# Patient Record
Sex: FEMALE | Race: WHITE | Employment: FULL TIME | URBAN - METROPOLITAN AREA
[De-identification: names, ages, dates, MRNs, and addresses within clinical notes are randomized per-mention and may not be internally consistent; named-entity substitution may affect disease eponyms.]

---

## 2019-09-06 PROBLEM — Z82.79 FAMILY HISTORY OF SPINA BIFIDA: Status: ACTIVE | Noted: 2019-09-06

## 2019-09-18 PROBLEM — O20.9 VAGINAL BLEEDING AFFECTING EARLY PREGNANCY: Status: ACTIVE | Noted: 2019-09-18

## 2019-09-28 LAB
ANTIBODY SCREEN, EXTERNAL: NEGATIVE
HBSAG, EXTERNAL: NEGATIVE
RUBELLA, EXTERNAL: NORMAL
TYPE, ABO & RH, EXTERNAL: NORMAL

## 2019-12-17 PROBLEM — O36.62X0 EXCESSIVE FETAL GROWTH AFFECTING MANAGEMENT OF PREGNANCY IN SECOND TRIMESTER: Status: ACTIVE | Noted: 2019-12-17

## 2020-02-23 ENCOUNTER — HOSPITAL ENCOUNTER (EMERGENCY)
Age: 35
Discharge: HOME | End: 2020-02-23
Attending: EMERGENCY MEDICINE
Payer: COMMERCIAL

## 2020-02-23 ENCOUNTER — APPOINTMENT (OUTPATIENT)
Dept: VASCULAR SURGERY | Age: 35
End: 2020-02-23
Attending: EMERGENCY MEDICINE
Payer: COMMERCIAL

## 2020-02-23 VITALS
WEIGHT: 182 LBS | DIASTOLIC BLOOD PRESSURE: 70 MMHG | OXYGEN SATURATION: 100 % | HEART RATE: 89 BPM | RESPIRATION RATE: 16 BRPM | BODY MASS INDEX: 30.32 KG/M2 | TEMPERATURE: 98.6 F | HEIGHT: 65 IN | SYSTOLIC BLOOD PRESSURE: 118 MMHG

## 2020-02-23 DIAGNOSIS — M79.89 LEG SWELLING: Primary | ICD-10-CM

## 2020-02-23 DIAGNOSIS — E87.6 HYPOKALEMIA: ICD-10-CM

## 2020-02-23 DIAGNOSIS — Z3A.30 30 WEEKS GESTATION OF PREGNANCY: ICD-10-CM

## 2020-02-23 LAB
ALBUMIN SERPL-MCNC: 2.5 G/DL (ref 3.4–5)
ALBUMIN/GLOB SERPL: 0.8 {RATIO} (ref 0.8–1.7)
ALP SERPL-CCNC: 81 U/L (ref 45–117)
ALT SERPL-CCNC: 18 U/L (ref 13–56)
ANION GAP SERPL CALC-SCNC: 8 MMOL/L (ref 3–18)
APTT PPP: 27.1 SEC (ref 23–36.4)
AST SERPL-CCNC: 19 U/L (ref 10–38)
BASOPHILS # BLD: 0 K/UL (ref 0–0.1)
BASOPHILS NFR BLD: 0 % (ref 0–2)
BILIRUB SERPL-MCNC: 0.2 MG/DL (ref 0.2–1)
BUN SERPL-MCNC: 4 MG/DL (ref 7–18)
BUN/CREAT SERPL: 9 (ref 12–20)
CALCIUM SERPL-MCNC: 8.1 MG/DL (ref 8.5–10.1)
CHLORIDE SERPL-SCNC: 111 MMOL/L (ref 100–111)
CO2 SERPL-SCNC: 24 MMOL/L (ref 21–32)
CREAT SERPL-MCNC: 0.47 MG/DL (ref 0.6–1.3)
DIFFERENTIAL METHOD BLD: ABNORMAL
EOSINOPHIL # BLD: 0.1 K/UL (ref 0–0.4)
EOSINOPHIL NFR BLD: 1 % (ref 0–5)
ERYTHROCYTE [DISTWIDTH] IN BLOOD BY AUTOMATED COUNT: 13.7 % (ref 11.6–14.5)
GLOBULIN SER CALC-MCNC: 3.2 G/DL (ref 2–4)
GLUCOSE SERPL-MCNC: 94 MG/DL (ref 74–99)
HCT VFR BLD AUTO: 34.7 % (ref 35–45)
HGB BLD-MCNC: 11.7 G/DL (ref 12–16)
INR PPP: 0.9 (ref 0.8–1.2)
LYMPHOCYTES # BLD: 1.4 K/UL (ref 0.9–3.6)
LYMPHOCYTES NFR BLD: 18 % (ref 21–52)
MCH RBC QN AUTO: 30.7 PG (ref 24–34)
MCHC RBC AUTO-ENTMCNC: 33.7 G/DL (ref 31–37)
MCV RBC AUTO: 91.1 FL (ref 74–97)
MONOCYTES # BLD: 0.6 K/UL (ref 0.05–1.2)
MONOCYTES NFR BLD: 7 % (ref 3–10)
NEUTS SEG # BLD: 5.9 K/UL (ref 1.8–8)
NEUTS SEG NFR BLD: 74 % (ref 40–73)
PLATELET # BLD AUTO: 128 K/UL (ref 135–420)
PMV BLD AUTO: 11.2 FL (ref 9.2–11.8)
POTASSIUM SERPL-SCNC: 3.2 MMOL/L (ref 3.5–5.5)
PROT SERPL-MCNC: 5.7 G/DL (ref 6.4–8.2)
PROTHROMBIN TIME: 12.3 SEC (ref 11.5–15.2)
RBC # BLD AUTO: 3.81 M/UL (ref 4.2–5.3)
SODIUM SERPL-SCNC: 143 MMOL/L (ref 136–145)
WBC # BLD AUTO: 7.9 K/UL (ref 4.6–13.2)

## 2020-02-23 PROCEDURE — 74011250637 HC RX REV CODE- 250/637: Performed by: EMERGENCY MEDICINE

## 2020-02-23 PROCEDURE — 85610 PROTHROMBIN TIME: CPT

## 2020-02-23 PROCEDURE — 85025 COMPLETE CBC W/AUTO DIFF WBC: CPT

## 2020-02-23 PROCEDURE — 85730 THROMBOPLASTIN TIME PARTIAL: CPT

## 2020-02-23 PROCEDURE — 80053 COMPREHEN METABOLIC PANEL: CPT

## 2020-02-23 PROCEDURE — 99283 EMERGENCY DEPT VISIT LOW MDM: CPT

## 2020-02-23 PROCEDURE — 93971 EXTREMITY STUDY: CPT

## 2020-02-23 RX ORDER — POTASSIUM CHLORIDE 20 MEQ/1
40 TABLET, EXTENDED RELEASE ORAL
Status: COMPLETED | OUTPATIENT
Start: 2020-02-23 | End: 2020-02-23

## 2020-02-23 RX ADMIN — POTASSIUM CHLORIDE 40 MEQ: 20 TABLET, EXTENDED RELEASE ORAL at 10:38

## 2020-02-23 NOTE — ED TRIAGE NOTES
Pt is 30 weeks pregnant, reports rt leg swelling onset Thursday, pt states she is moving and has been up on feet more often. Swelling is intermittent, denies pain.

## 2020-02-23 NOTE — ED PROVIDER NOTES
EMERGENCY DEPARTMENT HISTORY AND PHYSICAL EXAM    Date: 2020  Patient Name: Skyler Kline    History of Presenting Illness     Chief Complaint   Patient presents with    Leg Swelling         History Provided By: Patient and Patient's      History Alan Robert):   8:31 AM  Skyler Kline is a 30 y.o. female  at 30-0/7 with no significant PMHX who presents to the emergency department C/O leg swelling onset Thursday (3 days ago). Patient complains of increased swelling to the bilateral legs since Thursday. She states that she has been up and helping move them out of their house and thinks that the increased activity may be having an effect on her leg swelling. She also notes this morning that the right leg was swollen worse than the left which is unusual for her. She does admit to sleeping on her right side last night. Associated sxs include right leg swelling. Pt denies chest pain, shortness of breath, vaginal bleeding, loss of amniotic fluid, dysuria or any other sxs or complaints. Patient states she feels normal fetal movement. Chief Complaint: RLE swelling  Duration: 3 days  Timing:  acute  Location: RLE  Quality: Pressure  Severity: Mild  Modifying Factors: Nothing makes it better or worse. Associated Symptoms: denies any other associated signs or symptoms    PCP: None  OB/GYN: [facility]     Current Outpatient Medications   Medication Sig Dispense Refill    PNV66-Iron Fumarate-FA-DSS-DHA 26-1.2- mg cap Take  by mouth. Past History     Past Medical History:  History reviewed. No pertinent past medical history. Past Surgical History:  History reviewed. No pertinent surgical history. Family History:  History reviewed. No pertinent family history.     Social History:  Social History     Tobacco Use    Smoking status: Never Smoker    Smokeless tobacco: Never Used   Substance Use Topics    Alcohol use: Never     Frequency: Never    Drug use: Never       Allergies:  No Known Allergies      Review of Systems   Review of Systems   Constitutional: Negative for chills and fever. Respiratory: Negative for shortness of breath. Cardiovascular: Positive for leg swelling. Negative for chest pain. Gastrointestinal: Negative for abdominal pain, diarrhea and vomiting. Genitourinary: Negative for dysuria, pelvic pain, vaginal bleeding and vaginal discharge. All other systems reviewed and are negative. Physical Exam     Vitals:    02/23/20 0817   BP: 120/73   Pulse: 94   Resp: 16   Temp: 98.6 °F (37 °C)   SpO2: 100%   Weight: 82.6 kg (182 lb)   Height: 5' 5\" (1.651 m)     Physical Exam  Vitals signs and nursing note reviewed. Vital signs and nursing notes reviewed  CONSTITUTIONAL: Alert, in no apparent distress; well-developed; well-nourished. HEAD:  Normocephalic, atraumatic  EYES: PERRL; EOM's intact. ENTM: Nose: no rhinorrhea; Throat: no erythema or exudate, mucous membranes moist  Neck:  No JVD, supple without lymphadenopathy  RESP: Chest clear, equal breath sounds. CV: S1 and S2 WNL; No murmurs, gallops or rubs. GI: Normal bowel sounds, abdomen soft and non-tender. No masses or organomegaly. Gravid uterus, 10 cm above the umbilicus. : No costo-vertebral angle tenderness. BACK:  Non-tender  UPPER EXT:  Normal inspection  LOWER EXT: No edema, no calf tenderness. Distal pulses intact. NEURO: CN intact, reflexes 2/4 and sym, strength 5/5 and sym, sensation intact. SKIN: No rashes; Normal for age and stage. PSYCH:  Alert and oriented, normal affect.      Diagnostic Study Results     Labs -     Recent Results (from the past 12 hour(s))   CBC WITH AUTOMATED DIFF    Collection Time: 02/23/20  8:40 AM   Result Value Ref Range    WBC 7.9 4.6 - 13.2 K/uL    RBC 3.81 (L) 4.20 - 5.30 M/uL    HGB 11.7 (L) 12.0 - 16.0 g/dL    HCT 34.7 (L) 35.0 - 45.0 %    MCV 91.1 74.0 - 97.0 FL    MCH 30.7 24.0 - 34.0 PG    MCHC 33.7 31.0 - 37.0 g/dL    RDW 13.7 11.6 - 14.5 % PLATELET 767 (L) 742 - 420 K/uL    MPV 11.2 9.2 - 11.8 FL    NEUTROPHILS 74 (H) 40 - 73 %    LYMPHOCYTES 18 (L) 21 - 52 %    MONOCYTES 7 3 - 10 %    EOSINOPHILS 1 0 - 5 %    BASOPHILS 0 0 - 2 %    ABS. NEUTROPHILS 5.9 1.8 - 8.0 K/UL    ABS. LYMPHOCYTES 1.4 0.9 - 3.6 K/UL    ABS. MONOCYTES 0.6 0.05 - 1.2 K/UL    ABS. EOSINOPHILS 0.1 0.0 - 0.4 K/UL    ABS. BASOPHILS 0.0 0.0 - 0.1 K/UL    DF AUTOMATED     PROTHROMBIN TIME + INR    Collection Time: 02/23/20  8:40 AM   Result Value Ref Range    Prothrombin time 12.3 11.5 - 15.2 sec    INR 0.9 0.8 - 1.2     METABOLIC PANEL, COMPREHENSIVE    Collection Time: 02/23/20  8:40 AM   Result Value Ref Range    Sodium 143 136 - 145 mmol/L    Potassium 3.2 (L) 3.5 - 5.5 mmol/L    Chloride 111 100 - 111 mmol/L    CO2 24 21 - 32 mmol/L    Anion gap 8 3.0 - 18 mmol/L    Glucose 94 74 - 99 mg/dL    BUN 4 (L) 7.0 - 18 MG/DL    Creatinine 0.47 (L) 0.6 - 1.3 MG/DL    BUN/Creatinine ratio 9 (L) 12 - 20      GFR est AA >60 >60 ml/min/1.73m2    GFR est non-AA >60 >60 ml/min/1.73m2    Calcium 8.1 (L) 8.5 - 10.1 MG/DL    Bilirubin, total 0.2 0.2 - 1.0 MG/DL    ALT (SGPT) 18 13 - 56 U/L    AST (SGOT) 19 10 - 38 U/L    Alk. phosphatase 81 45 - 117 U/L    Protein, total 5.7 (L) 6.4 - 8.2 g/dL    Albumin 2.5 (L) 3.4 - 5.0 g/dL    Globulin 3.2 2.0 - 4.0 g/dL    A-G Ratio 0.8 0.8 - 1.7     PTT    Collection Time: 02/23/20  8:40 AM   Result Value Ref Range    aPTT 27.1 23.0 - 36.4 SEC       Radiologic Studies -   Prelim on duplex RLE demonstrates no DVT    No orders to display     CT Results  (Last 48 hours)    None        CXR Results  (Last 48 hours)    None          Medications given in the ED-  Medications   potassium chloride (K-DUR, KLOR-CON) SR tablet 40 mEq (40 mEq Oral Given 2/23/20 1038)         Medical Decision Making   I am the first provider for this patient.     I reviewed the vital signs, available nursing notes, past medical history, past surgical history, family history and social history. Vital Signs-Reviewed the patient's vital signs. Pulse Oximetry Analysis - 100% on RA     Cardiac Monitor:  Rate: 94 bpm  Rhythm: NSR    Records Reviewed: Nursing Notes    Provider Notes (Medical Decision Making):   DDX: IVC compression during sleep, pregnancy, dependent edema, very unlikely DVT    Procedures:  Procedures    ED Course:   8:31 AM Initial assessment performed. The patients presenting problems have been discussed, and they are in agreement with the care plan formulated and outlined with them. I have encouraged them to ask questions as they arise throughout their visit. 8:38 AM FHT: 146-150 by Eulogio Tovar RN      Diagnosis and Disposition     Discussion:  30 y.o. female with RLE swelling since last night bilateral swelling since Thursday. Patient most likely has some IVC compression due to sleeping on her right side. Patient is otherwise stable has no DVTs and is stable for discharge home. Patient has follow-up with her OB/GYN at [facility] tomorrow. Patient understands and agrees with this plan. DISCHARGE NOTE:  11:03 AM   Adincarvind Harris Linsey's  results have been reviewed with her. She has been counseled regarding her diagnosis, treatment, and plan. She verbally conveys understanding and agreement of the signs, symptoms, diagnosis, treatment and prognosis and additionally agrees to follow up as discussed. She also agrees with the care-plan and conveys that all of her questions have been answered. I have also provided discharge instructions for her that include: educational information regarding their diagnosis and treatment, and list of reasons why they would want to return to the ED prior to their follow-up appointment, should her condition change. She has been provided with education for proper emergency department utilization. CLINICAL IMPRESSION:    1. Leg swelling    2. 30 weeks gestation of pregnancy    3. Hypokalemia        PLAN:  1. D/C Home  2.    Current Discharge Medication List        3. Follow-up Information     Follow up With Specialties Details Why [address]  Internal Medicine  In 1 day For follow up from Emergency Department visit. [address]    [facility] [facility] EMERGENCY DEPT Emergency Medicine  As needed; If symptoms worsen [address] [id number] [address]     Please note that this dictation was completed with [email or web address], the computer voice recognition software. Quite often unanticipated grammatical, syntax, homophones, and other interpretive errors are inadvertently transcribed by the computer software. Please disregard these errors. Please excuse any errors that have escaped final proofreading.     Sergio Cortez MD

## 2020-02-23 NOTE — DISCHARGE INSTRUCTIONS
Patient Education        Weeks 26 to 30 of Your Pregnancy: Care Instructions  Your Care Instructions    You are now in your last trimester of pregnancy. Your baby is growing rapidly. And you'll probably feel your baby moving around more often. Your doctor may ask you to count your baby's kicks. Your back may ache as your body gets used to your baby's size and length. If you haven't already had the Tdap shot during this pregnancy, talk to your doctor about getting it. It will help protect your  against pertussis infection. During this time, it's important to take care of yourself and pay attention to what your body needs. If you feel sexual, explore ways to be close with your partner that match your comfort and desire. Use the tips provided in this care sheet to find ways to be sexual in your own way. Follow-up care is a key part of your treatment and safety. Be sure to make and go to all appointments, and call your doctor if you are having problems. It's also a good idea to know your test results and keep a list of the medicines you take. How can you care for yourself at home? Take it easy at work  · Take frequent breaks. If possible, stop working when you are tired, and rest during your lunch hour. · Take bathroom breaks every 2 hours. · Change positions often. If you sit for long periods, stand up and walk around. · When you stand for a long time, keep one foot on a low stool with your knee bent. After standing a lot, sit with your feet up. · Avoid fumes, chemicals, and tobacco smoke. Be sexual in your own way  · Having sex during pregnancy is okay, unless your doctor tells you not to. · You may be very interested in sex, or you may have no interest at all. · Your growing belly can make it hard to find a good position during intercourse. [place] and explore. · You may get cramps in your uterus when your partner touches your breasts.   · A back rub may relieve the backache or cramps that sometimes follow orgasm. Learn about  labor  · Watch for signs of  labor. You may be going into labor if:  ? You have menstrual-like cramps, with or without nausea. ? You have about 6 or more contractions in 1 hour, even after you have had a glass of water and are resting. ? You have a low, dull backache that does not go away when you change your position. ? You have pain or pressure in your pelvis that comes and goes in a pattern. ? You have intestinal cramping or flu-like symptoms, with or without diarrhea.  ? You notice an increase or change in your vaginal discharge. Discharge may be heavy, mucus-like, watery, or streaked with blood. ? Your water breaks. · If you think you have  labor:  ? Drink 2 or 3 glasses of water or juice. Not drinking enough fluids can cause contractions. ? Stop what you are doing, and empty your bladder. Then lie down on your left side for at least 1 hour. ? While lying on your side, find your breast bone. Put your fingers in the soft spot just below it. Move your fingers down toward your belly button to find the top of your uterus. Check to see if it is tight. ? Contractions can be weak or strong. Record your contractions for an hour. Time a contraction from the start of one contraction to the start of the next one.  ? Single or several strong contractions without a pattern are called Corbin-Amezcua contractions. They are practice contractions but not the start of labor. They often stop if you change what you are doing. ? Call your doctor if you have regular contractions. Where can you learn more? Go to http://alex-lili.info/. Enter K644 in the search box to learn more about \"Weeks 26 to 30 of Your Pregnancy: Care Instructions. \"  Current as of: May 29, 2019  Content Version: 12.2  © 6479-5949 [employer].  Care instructions adapted under license by [employer] (which disclaims liability or warranty for this information). If you have questions about a medical condition or this instruction, always ask your healthcare professional. [address] any warranty or liability for your use of this information. Patient Education        Hypokalemia: Care Instructions  Your Care Instructions    Hypokalemia (say \"wd-gs-[id number]-ASHER-loreto-uh\") is a low level of potassium. The heart, muscles, kidneys, and nervous system all need potassium to work well. This problem has many different causes. Kidney problems, diet, and medicines like diuretics and laxatives can cause it. So can vomiting or diarrhea. In some cases, cancer is the cause. Your doctor may do tests to find the cause of your low potassium levels. You may need medicines to bring your potassium levels back to normal. You may also need regular blood tests to check your potassium. If you have very low potassium, you may need intravenous (IV) medicines. You also may need tests to check the electrical activity of your heart. Heart problems caused by low potassium levels can be very serious. Follow-up care is a key part of your treatment and safety. Be sure to make and go to all appointments, and call your doctor if you are having problems. It's also a good idea to know your test results and keep a list of the medicines you take. How can you care for yourself at home? · If your doctor recommends it, eat foods that have a lot of potassium. These include fresh fruits, juices, and vegetables. They also include nuts, beans, and milk. · Be safe with medicines. If your doctor prescribes medicines or potassium supplements, take them exactly as directed. Call your doctor if you have any problems with your medicines. · Get your potassium levels tested as often as your doctor tells you. When should you call for help? Call 911 anytime you think you may need emergency care. For example, call if:    · You feel like your heart is missing beats.  Heart problems caused by low potassium can cause death.     · You passed out (lost consciousness).     · You have a seizure.    Call your doctor now or seek immediate medical care if:    · You feel weak or unusually tired.     · You have severe arm or leg cramps.     · You have tingling or numbness.     · You feel sick to your stomach, or you vomit.     · You have belly cramps.     · You feel bloated or constipated.     · You have to urinate a lot.     · You feel very thirsty most of the time.     · You are dizzy or lightheaded, or you feel like you may faint.     · You feel depressed, or you lose touch with reality.    Watch closely for changes in your health, and be sure to contact your doctor if:    · You do not get better as expected. Where can you learn more? Go to http://alex-lili.info/. Enter G358 in the search box to learn more about \"Hypokalemia: Care Instructions. \"  Current as of: November 6, 2018  Content Version: 12.2  © 4575-0511 [employer]. Care instructions adapted under license by [employer] (which disclaims liability or warranty for this information). If you have questions about a medical condition or this instruction, always ask your healthcare professional. [address] any warranty or liability for your use of this information.

## 2020-04-06 LAB — GRBS, EXTERNAL: NEGATIVE

## 2020-05-04 ENCOUNTER — HOSPITAL ENCOUNTER (INPATIENT)
Age: 35
LOS: 2 days | Discharge: HOME | End: 2020-05-06
Attending: OBSTETRICS & GYNECOLOGY | Admitting: OBSTETRICS & GYNECOLOGY
Payer: COMMERCIAL

## 2020-05-04 ENCOUNTER — ANESTHESIA (OUTPATIENT)
Dept: LABOR AND DELIVERY | Age: 35
End: 2020-05-04
Payer: COMMERCIAL

## 2020-05-04 ENCOUNTER — ANESTHESIA EVENT (OUTPATIENT)
Dept: LABOR AND DELIVERY | Age: 35
End: 2020-05-04
Payer: COMMERCIAL

## 2020-05-04 PROBLEM — Z34.83 SUPERVISION OF NORMAL IUP (INTRAUTERINE PREGNANCY) IN MULTIGRAVIDA, THIRD TRIMESTER: Status: ACTIVE | Noted: 2020-05-04

## 2020-05-04 LAB
ABO + RH BLD: NORMAL
BASOPHILS # BLD: 0 K/UL (ref 0–0.1)
BASOPHILS NFR BLD: 0 % (ref 0–2)
BLOOD GROUP ANTIBODIES SERPL: NORMAL
DIFFERENTIAL METHOD BLD: ABNORMAL
EOSINOPHIL # BLD: 0.1 K/UL (ref 0–0.4)
EOSINOPHIL NFR BLD: 0 % (ref 0–5)
ERYTHROCYTE [DISTWIDTH] IN BLOOD BY AUTOMATED COUNT: 14.1 % (ref 11.6–14.5)
HCT VFR BLD AUTO: 40.6 % (ref 35–45)
HGB BLD-MCNC: 13.8 G/DL (ref 12–16)
LYMPHOCYTES # BLD: 1.8 K/UL (ref 0.9–3.6)
LYMPHOCYTES NFR BLD: 14 % (ref 21–52)
MCH RBC QN AUTO: 30.7 PG (ref 24–34)
MCHC RBC AUTO-ENTMCNC: 34 G/DL (ref 31–37)
MCV RBC AUTO: 90.4 FL (ref 74–97)
MONOCYTES # BLD: 0.7 K/UL (ref 0.05–1.2)
MONOCYTES NFR BLD: 6 % (ref 3–10)
NEUTS SEG # BLD: 10.6 K/UL (ref 1.8–8)
NEUTS SEG NFR BLD: 80 % (ref 40–73)
PLATELET # BLD AUTO: 132 K/UL (ref 135–420)
PMV BLD AUTO: 11.6 FL (ref 9.2–11.8)
RBC # BLD AUTO: 4.49 M/UL (ref 4.2–5.3)
SPECIMEN EXP DATE BLD: NORMAL
WBC # BLD AUTO: 13.2 K/UL (ref 4.6–13.2)

## 2020-05-04 PROCEDURE — 74011000250 HC RX REV CODE- 250: Performed by: NURSE ANESTHETIST, CERTIFIED REGISTERED

## 2020-05-04 PROCEDURE — 0KQM0ZZ REPAIR PERINEUM MUSCLE, OPEN APPROACH: ICD-10-PCS | Performed by: ADVANCED PRACTICE MIDWIFE

## 2020-05-04 PROCEDURE — 74011250636 HC RX REV CODE- 250/636: Performed by: MIDWIFE

## 2020-05-04 PROCEDURE — 74011000250 HC RX REV CODE- 250

## 2020-05-04 PROCEDURE — 74011250636 HC RX REV CODE- 250/636: Performed by: ADVANCED PRACTICE MIDWIFE

## 2020-05-04 PROCEDURE — 75410000000 HC DELIVERY VAGINAL/SINGLE

## 2020-05-04 PROCEDURE — 36415 COLL VENOUS BLD VENIPUNCTURE: CPT

## 2020-05-04 PROCEDURE — 77030040830 HC CATH URETH FOL MDII -A

## 2020-05-04 PROCEDURE — 74011250636 HC RX REV CODE- 250/636

## 2020-05-04 PROCEDURE — 00HU33Z INSERTION OF INFUSION DEVICE INTO SPINAL CANAL, PERCUTANEOUS APPROACH: ICD-10-PCS | Performed by: ANESTHESIOLOGY

## 2020-05-04 PROCEDURE — 85025 COMPLETE CBC W/AUTO DIFF WBC: CPT

## 2020-05-04 PROCEDURE — 74011250636 HC RX REV CODE- 250/636: Performed by: NURSE ANESTHETIST, CERTIFIED REGISTERED

## 2020-05-04 PROCEDURE — 65270000029 HC RM PRIVATE

## 2020-05-04 PROCEDURE — 74011250637 HC RX REV CODE- 250/637: Performed by: ADVANCED PRACTICE MIDWIFE

## 2020-05-04 PROCEDURE — 77030007879 HC KT SPN EPDRL TELE -B: Performed by: ANESTHESIOLOGY

## 2020-05-04 PROCEDURE — 76060000078 HC EPIDURAL ANESTHESIA

## 2020-05-04 PROCEDURE — 75410000002 HC LABOR FEE PER 1 HR

## 2020-05-04 PROCEDURE — 86900 BLOOD TYPING SEROLOGIC ABO: CPT

## 2020-05-04 PROCEDURE — 75410000003 HC RECOV DEL/VAG/CSECN EA 0.5 HR

## 2020-05-04 RX ORDER — FENTANYL/ROPIVACAINE/NS/PF 2MCG/ML-.1
PLASTIC BAG, INJECTION (ML) EPIDURAL
Status: COMPLETED
Start: 2020-05-04 | End: 2020-05-04

## 2020-05-04 RX ORDER — FENTANYL/ROPIVACAINE/NS/PF 2MCG/ML-.1
1-15 PLASTIC BAG, INJECTION (ML) EPIDURAL
Status: DISCONTINUED | OUTPATIENT
Start: 2020-05-04 | End: 2020-05-05

## 2020-05-04 RX ORDER — SODIUM CHLORIDE 0.9 % (FLUSH) 0.9 %
5-40 SYRINGE (ML) INJECTION AS NEEDED
Status: DISCONTINUED | OUTPATIENT
Start: 2020-05-04 | End: 2020-05-05

## 2020-05-04 RX ORDER — OXYTOCIN/0.9 % SODIUM CHLORIDE 20/1000 ML
125 PLASTIC BAG, INJECTION (ML) INTRAVENOUS CONTINUOUS
Status: DISCONTINUED | OUTPATIENT
Start: 2020-05-04 | End: 2020-05-05

## 2020-05-04 RX ORDER — BUTORPHANOL TARTRATE 2 MG/ML
2 INJECTION INTRAMUSCULAR; INTRAVENOUS
Status: DISCONTINUED | OUTPATIENT
Start: 2020-05-04 | End: 2020-05-05

## 2020-05-04 RX ORDER — FENTANYL CITRATE 50 UG/ML
100 INJECTION, SOLUTION INTRAMUSCULAR; INTRAVENOUS ONCE
Status: COMPLETED | OUTPATIENT
Start: 2020-05-04 | End: 2020-05-04

## 2020-05-04 RX ORDER — ONDANSETRON 2 MG/ML
4 INJECTION INTRAMUSCULAR; INTRAVENOUS
Status: DISCONTINUED | OUTPATIENT
Start: 2020-05-04 | End: 2020-05-05

## 2020-05-04 RX ORDER — ACETAMINOPHEN 10 MG/ML
1000 INJECTION, SOLUTION INTRAVENOUS ONCE
Status: COMPLETED | OUTPATIENT
Start: 2020-05-04 | End: 2020-05-05

## 2020-05-04 RX ORDER — LIDOCAINE HYDROCHLORIDE 10 MG/ML
INJECTION INFILTRATION; PERINEURAL AS NEEDED
Status: DISCONTINUED | OUTPATIENT
Start: 2020-05-04 | End: 2020-05-04 | Stop reason: HOSPADM

## 2020-05-04 RX ORDER — LIDOCAINE HYDROCHLORIDE AND EPINEPHRINE 15; 5 MG/ML; UG/ML
INJECTION, SOLUTION EPIDURAL AS NEEDED
Status: DISCONTINUED | OUTPATIENT
Start: 2020-05-04 | End: 2020-05-04 | Stop reason: HOSPADM

## 2020-05-04 RX ORDER — MINERAL OIL
30 OIL (ML) ORAL AS NEEDED
Status: COMPLETED | OUTPATIENT
Start: 2020-05-04 | End: 2020-05-04

## 2020-05-04 RX ORDER — HYDROMORPHONE HYDROCHLORIDE 1 MG/ML
1 INJECTION, SOLUTION INTRAMUSCULAR; INTRAVENOUS; SUBCUTANEOUS
Status: DISCONTINUED | OUTPATIENT
Start: 2020-05-04 | End: 2020-05-05

## 2020-05-04 RX ORDER — SODIUM CHLORIDE 0.9 % (FLUSH) 0.9 %
5-40 SYRINGE (ML) INJECTION EVERY 8 HOURS
Status: DISCONTINUED | OUTPATIENT
Start: 2020-05-04 | End: 2020-05-05

## 2020-05-04 RX ORDER — OXYTOCIN/0.9 % SODIUM CHLORIDE 30/500 ML
0-20 PLASTIC BAG, INJECTION (ML) INTRAVENOUS
Status: DISCONTINUED | OUTPATIENT
Start: 2020-05-04 | End: 2020-05-05

## 2020-05-04 RX ORDER — OXYTOCIN/0.9 % SODIUM CHLORIDE 20/1000 ML
999 PLASTIC BAG, INJECTION (ML) INTRAVENOUS ONCE
Status: DISPENSED | OUTPATIENT
Start: 2020-05-04 | End: 2020-05-04

## 2020-05-04 RX ORDER — NALBUPHINE HYDROCHLORIDE 10 MG/ML
10 INJECTION, SOLUTION INTRAMUSCULAR; INTRAVENOUS; SUBCUTANEOUS
Status: DISCONTINUED | OUTPATIENT
Start: 2020-05-04 | End: 2020-05-05

## 2020-05-04 RX ORDER — ROPIVACAINE IN 0.9% SOD CHL/PF 0.2 %
PLASTIC BAG, INJECTION (ML) EPIDURAL AS NEEDED
Status: DISCONTINUED | OUTPATIENT
Start: 2020-05-04 | End: 2020-05-04 | Stop reason: HOSPADM

## 2020-05-04 RX ORDER — METHYLERGONOVINE MALEATE 0.2 MG/ML
0.2 INJECTION INTRAVENOUS AS NEEDED
Status: DISCONTINUED | OUTPATIENT
Start: 2020-05-04 | End: 2020-05-05 | Stop reason: HOSPADM

## 2020-05-04 RX ORDER — FENTANYL CITRATE 50 UG/ML
INJECTION, SOLUTION INTRAMUSCULAR; INTRAVENOUS AS NEEDED
Status: DISCONTINUED | OUTPATIENT
Start: 2020-05-04 | End: 2020-05-04 | Stop reason: HOSPADM

## 2020-05-04 RX ORDER — LIDOCAINE HYDROCHLORIDE 10 MG/ML
20 INJECTION, SOLUTION EPIDURAL; INFILTRATION; INTRACAUDAL; PERINEURAL AS NEEDED
Status: DISCONTINUED | OUTPATIENT
Start: 2020-05-04 | End: 2020-05-05

## 2020-05-04 RX ORDER — TERBUTALINE SULFATE 1 MG/ML
0.25 INJECTION SUBCUTANEOUS
Status: DISCONTINUED | OUTPATIENT
Start: 2020-05-04 | End: 2020-05-05

## 2020-05-04 RX ORDER — NALOXONE HYDROCHLORIDE 0.4 MG/ML
0.2 INJECTION, SOLUTION INTRAMUSCULAR; INTRAVENOUS; SUBCUTANEOUS AS NEEDED
Status: DISCONTINUED | OUTPATIENT
Start: 2020-05-04 | End: 2020-05-05

## 2020-05-04 RX ORDER — FENTANYL CITRATE 50 UG/ML
INJECTION, SOLUTION INTRAMUSCULAR; INTRAVENOUS
Status: COMPLETED
Start: 2020-05-04 | End: 2020-05-04

## 2020-05-04 RX ORDER — SODIUM CHLORIDE, SODIUM LACTATE, POTASSIUM CHLORIDE, CALCIUM CHLORIDE 600; 310; 30; 20 MG/100ML; MG/100ML; MG/100ML; MG/100ML
125 INJECTION, SOLUTION INTRAVENOUS CONTINUOUS
Status: DISCONTINUED | OUTPATIENT
Start: 2020-05-04 | End: 2020-05-05

## 2020-05-04 RX ADMIN — Medication 2 MILLI-UNITS/MIN: at 12:15

## 2020-05-04 RX ADMIN — SODIUM CHLORIDE, SODIUM LACTATE, POTASSIUM CHLORIDE, AND CALCIUM CHLORIDE 125 ML/HR: 600; 310; 30; 20 INJECTION, SOLUTION INTRAVENOUS at 14:40

## 2020-05-04 RX ADMIN — FENTANYL CITRATE 50 MCG: 50 INJECTION, SOLUTION INTRAMUSCULAR; INTRAVENOUS at 14:30

## 2020-05-04 RX ADMIN — FENTANYL CITRATE 100 MCG: 50 INJECTION INTRAMUSCULAR; INTRAVENOUS at 14:19

## 2020-05-04 RX ADMIN — FENTANYL CITRATE 100 MCG: 50 INJECTION, SOLUTION INTRAMUSCULAR; INTRAVENOUS at 14:19

## 2020-05-04 RX ADMIN — ACETAMINOPHEN 1000 MG: 10 INJECTION, SOLUTION INTRAVENOUS at 22:49

## 2020-05-04 RX ADMIN — SODIUM CHLORIDE, SODIUM LACTATE, POTASSIUM CHLORIDE, AND CALCIUM CHLORIDE 125 ML/HR: 600; 310; 30; 20 INJECTION, SOLUTION INTRAVENOUS at 09:00

## 2020-05-04 RX ADMIN — SODIUM CHLORIDE, SODIUM LACTATE, POTASSIUM CHLORIDE, AND CALCIUM CHLORIDE 125 ML/HR: 600; 310; 30; 20 INJECTION, SOLUTION INTRAVENOUS at 18:46

## 2020-05-04 RX ADMIN — MINERAL 30 ML: 999 OIL ORAL at 22:50

## 2020-05-04 RX ADMIN — Medication 999 ML/HR: at 23:21

## 2020-05-04 RX ADMIN — SODIUM CHLORIDE, SODIUM LACTATE, POTASSIUM CHLORIDE, AND CALCIUM CHLORIDE 500 ML: 600; 310; 30; 20 INJECTION, SOLUTION INTRAVENOUS at 06:57

## 2020-05-04 RX ADMIN — Medication 12 ML/HR: at 14:45

## 2020-05-04 RX ADMIN — FENTANYL CITRATE 50 MCG: 50 INJECTION, SOLUTION INTRAMUSCULAR; INTRAVENOUS at 14:34

## 2020-05-04 RX ADMIN — LIDOCAINE HYDROCHLORIDE,EPINEPHRINE BITARTRATE 5 ML: 15; .005 INJECTION, SOLUTION EPIDURAL; INFILTRATION; INTRACAUDAL; PERINEURAL at 14:29

## 2020-05-04 RX ADMIN — Medication 4 ML: at 14:30

## 2020-05-04 RX ADMIN — Medication 4 ML: at 14:34

## 2020-05-04 RX ADMIN — LIDOCAINE HYDROCHLORIDE 3 ML: 10 INJECTION, SOLUTION INFILTRATION; PERINEURAL at 14:23

## 2020-05-04 NOTE — PROGRESS NOTES
Labor Progress Note  Patient seen, fetal heart rate and contraction pattern evaluated, patient examined. Reports decreased pain from contractions. Resting comfortably with epidural in place  Patient Vitals for the past 1 hrs:   BP Pulse   05/04/20 1400 118/68 80       Physical Exam:  Cervical Exam:  5 cm dilated    80% effaced    0 station    Presenting Part: cephalic  Cervical Position: mid position  Membranes:  Artificial Rupture of Membranes;  Amniotic Fluid: small amount of thick meconium fluid  Uterine Activity: Frequency: Every 1-4 minutes, Duration: 60 seconds and Intensity: moderate  Fetal Heart Rate: Baseline: 140 per minute  Variability: moderate  Accelerations: no  Decelerations: variable  Uterine contractions: irregular, every 1-3 minutes    Assessment/Plan:  Reassuring fetal status, Labor  Progressing normally, Continue plan for vaginal delivery

## 2020-05-04 NOTE — PROGRESS NOTES
06 Pt is a 30 y.o.  at 40w3d, arrived to L&D triage with c/o of contractions. EFM and TOCO applied, call bell within reach. SVE /2. REINA Gutierrez CNM aware. Orders received for recheck in two hours, IV with fluids. 715 Bedside shift change report given to MARLI Rey (oncoming nurse) by Su Hart RN (offgoing nurse). Report included the following information SBAR, Kardex, Intake/Output, MAR and Recent Results.

## 2020-05-04 NOTE — ANESTHESIA PREPROCEDURE EVALUATION
Relevant Problems   No relevant active problems       Anesthetic History   No history of anesthetic complications            Review of Systems / Medical History  Patient summary reviewed, nursing notes reviewed and pertinent labs reviewed    Pulmonary  Within defined limits                 Neuro/Psych   Within defined limits           Cardiovascular                  Exercise tolerance: >4 METS     GI/Hepatic/Renal  Within defined limits              Endo/Other        Obesity     Other Findings   Comments: A person in her fathers family had spina bifida. Not herself and everything normal.           Physical Exam    Airway  Mallampati: III  TM Distance: < 4 cm  Neck ROM: normal range of motion   Mouth opening: Normal     Cardiovascular               Dental  No notable dental hx       Pulmonary                 Abdominal  GI exam deferred       Other Findings            Anesthetic Plan    ASA: 2  Anesthesia type: epidural            Anesthetic plan and risks discussed with: Patient      Risks and benefits of epidural include but not limited to a headache (with risk of repair requiring blood patch), backache, bleeding, infection, nerve injury, paralysis, failure with need to replace, aand hemodynamic changes.

## 2020-05-04 NOTE — H&P
History & Physical    Name: Maida Hardy MRN: [id number]  SSN: [id number]    YOB: 1985  Age: 30 y.o. Sex: female        Subjective:     Estimated Date of Delivery: 20  OB History    Para Term  AB Living   1             SAB TAB Ectopic Molar Multiple Live Births                    # Outcome Date GA Lbr Jose/2nd Weight Sex Delivery Anes PTL Lv   1 Current                Ms. Menezes Sow is admitted with pregnancy at 40w3d for early/active labor. Prenatal course was normal. Please see prenatal records for details. No past medical history on file. No past surgical history on file. Social History     Occupational History    Not on file   Tobacco Use    Smoking status: Never Smoker    Smokeless tobacco: Never Used   Substance and Sexual Activity    Alcohol use: Never     Frequency: Never    Drug use: Never    Sexual activity: Yes     Partners: Male     Birth control/protection: None     No family history on file. No Known Allergies  Prior to Admission medications    Medication Sig Start Date End Date Taking? Authorizing Provider   PNV66-Iron Fumarate-FA-DSS-DHA 26-1.2- mg cap Take  by mouth. Provider, Historical        Review of Systems: A comprehensive review of systems was negative except for that written in the HPI.     Objective:     Vitals:  Vitals:    20 0647 20 0700 20 0730 20 0743   BP: 114/72 123/68 116/74 119/68   Pulse: 97 82 85 78   Resp:   20    Temp:   98.4 °F (36.9 °C)         Physical Exam:  Cervical Exam: 4 cm dilated    60% effaced    -2 station    Presenting Part: cephalic  Cervical Position: mid position  Consistency: Soft  Membranes:  Intact  Fetal Heart Rate: Baseline: 140 per minute  Variability: moderate  Accelerations: no  Decelerations: none  Uterine contractions: regular, every 3-4 minutes    Prenatal Labs:   Lab Results   Component Value Date/Time    ABO/Rh(D) B POSITIVE 2020 06:40 AM         Assessment/Plan: Active Problems:    * No active hospital problems. *       Plan: Admit for Reassuring fetal status, Labor Progressing normally, Regular contractions, reassuring, however non-reactive tracing at 40 weeks gestation with cervical change. Will admit for early labor and augment as needed. Continue plan for vaginal delivery. Group B Strep was negative.

## 2020-05-04 NOTE — ANESTHESIA PROCEDURE NOTES
Epidural Block    Start time: 5/4/2020 2:22 PM  End time: 5/4/2020 2:30 PM  Performed by: Ethel Mandujano, CRNA  Authorized by: Keisha Conway MD     Pre-Procedure  Indication: labor epidural    Preanesthetic Checklist: patient identified, risks and benefits discussed, anesthesia consent, site marked, patient being monitored, timeout performed and anesthesia consent    Timeout Time: 14:22        Epidural:   Patient position:  Seated  Prep region:  Lumbar  Prep: Chlorhexidine    Location:  L3-4    Needle and Epidural Catheter:   Needle Type:  Tuohy  Needle Gauge:  17 G  Injection Technique:  Loss of resistance using saline  Attempts:  1  Catheter Size:  20 G  Catheter at Skin Depth (cm):  11  Depth in Epidural Space (cm):  5  Events: no blood with aspiration, no cerebrospinal fluid with aspiration, no paresthesia and negative aspiration test    Test Dose:  Negative    Assessment:   Catheter Secured:  Tegaderm and tape  Insertion:  Uncomplicated  Patient tolerance:  Patient tolerated the procedure well with no immediate complications

## 2020-05-04 NOTE — ROUTINE PROCESS
0715 Bedside and Verbal shift change report given to R. Romaine Schlatter (oncoming nurse) by Han Lawrence RN (offgoing nurse). Report included the following information SBAR, Kardex, Intake/Output, MAR, Accordion and Recent Results. 0756 Pt sitting on birthing ball. US and TOCO adjusted. Call bell within reach. 0820 Pt ambulating to the bathroom. Voiding. 3825 Pt in high fowlers. US nad TOCO adjusted. Call bell within reach. 7625 Pt ambulating to Room 1. 
 
0843 Pt oriented to the room and call bell. Pt in high fowlers. US and TOCO adjusted. Pt's family member at bedside. Call bell within reach. 0900 Pt on birthing ball. US and TOCO adjusted. Call bell within reach. 0940 Pt eating pop-sickle. Call bell within reach. 1132 Pt ambulating to the bathroom. Voiding. 1136 Pt in low fowlers. US and TOCO adjusted. Call bell within reach. 1356 Paged anesthesia. 200 Dr. Colonel Boone at bedside explaining epidural procedure, side effects, risks, benefits, and positioning. Patient verbalizes understanding. 1415 Pt ambulating to the bathroom. Voiding. 1421 Pt sitting up in high fowlers. US and TOCO adjusted. RN and CRNA at bedside. Time-out: 1422 Procedure JZBTU:5991 Catheter in, needle out: 1428 Test dose: 7610 Fentanyl vial handed to MD at bedside. Loading RFOV:0722 Patient connected to pump:1435 1437 Pt in low fowlers. US and TOCO adjusted. Call bell within reach. 3100 Perimeter Fitzhugh at bedside. SVE: 5/80/0. AROM, small amount of thick meconium fluids. 1450 Tom placed without difficulty. Pt tolerated procedure well. 1455 Pt in low fowlers. US and TOCO adjusted. Call bell within reach. 1540 Pt on left lateral with peanut ball placed in. US and TOCO adjusted. Call bell within reach. Pt denies needs at this time. 1705 Pt in throne's position. US and TOCO adjusted. Call bell within reach. 1812 Pt turned on right side with peanut ball within.  US and TOCO adjusted. Call bell within reach. Pt denies needs at this time. [place] 6199 at bedside. SVE: 8/80/0. 
 
1845 Pt turned on right side with left leg on stirrup. US and TOCO adjusted. Call bell within reach. 1910 Bedside and Verbal shift change report given to REINA Rivera RN (oncoming nurse) by MARLI Wood (offgoing nurse). Report included the following information SBAR, Kardex, Procedure Summary, Intake/Output, MAR, Accordion and Recent Results.

## 2020-05-05 PROCEDURE — 75410000003 HC RECOV DEL/VAG/CSECN EA 0.5 HR

## 2020-05-05 PROCEDURE — 88307 TISSUE EXAM BY PATHOLOGIST: CPT

## 2020-05-05 PROCEDURE — 75410000002 HC LABOR FEE PER 1 HR

## 2020-05-05 PROCEDURE — 75410000000 HC DELIVERY VAGINAL/SINGLE

## 2020-05-05 PROCEDURE — 76060000078 HC EPIDURAL ANESTHESIA

## 2020-05-05 PROCEDURE — 74011250637 HC RX REV CODE- 250/637: Performed by: ADVANCED PRACTICE MIDWIFE

## 2020-05-05 PROCEDURE — 65270000029 HC RM PRIVATE

## 2020-05-05 RX ORDER — AMOXICILLIN 250 MG
1 CAPSULE ORAL
Status: DISCONTINUED | OUTPATIENT
Start: 2020-05-05 | End: 2020-05-06 | Stop reason: HOSPADM

## 2020-05-05 RX ORDER — OXYCODONE AND ACETAMINOPHEN 5; 325 MG/1; MG/1
2 TABLET ORAL
Status: DISCONTINUED | OUTPATIENT
Start: 2020-05-05 | End: 2020-05-06 | Stop reason: HOSPADM

## 2020-05-05 RX ORDER — IBUPROFEN 400 MG/1
800 TABLET ORAL
Status: DISCONTINUED | OUTPATIENT
Start: 2020-05-05 | End: 2020-05-06 | Stop reason: HOSPADM

## 2020-05-05 RX ORDER — ZOLPIDEM TARTRATE 5 MG/1
5 TABLET ORAL
Status: DISCONTINUED | OUTPATIENT
Start: 2020-05-05 | End: 2020-05-06 | Stop reason: HOSPADM

## 2020-05-05 RX ORDER — IBUPROFEN 800 MG/1
800 TABLET ORAL
Qty: 60 TAB | Refills: 1 | Status: SHIPPED | OUTPATIENT
Start: 2020-05-05 | End: 2021-07-15

## 2020-05-05 RX ORDER — PROMETHAZINE HYDROCHLORIDE 25 MG/ML
25 INJECTION, SOLUTION INTRAMUSCULAR; INTRAVENOUS
Status: DISCONTINUED | OUTPATIENT
Start: 2020-05-05 | End: 2020-05-05

## 2020-05-05 RX ORDER — ACETAMINOPHEN 325 MG/1
650 TABLET ORAL
Status: DISCONTINUED | OUTPATIENT
Start: 2020-05-05 | End: 2020-05-06 | Stop reason: HOSPADM

## 2020-05-05 RX ADMIN — IBUPROFEN 800 MG: 400 TABLET, FILM COATED ORAL at 02:46

## 2020-05-05 RX ADMIN — IBUPROFEN 800 MG: 400 TABLET, FILM COATED ORAL at 16:12

## 2020-05-05 NOTE — PROGRESS NOTES
46- TRANSFER - IN REPORT:  Verbal report received from Kathryn Xiao on [facility]  being received from Labor and Delivery for routine progression of care  Report consisted of patients Situation, Background, Assessment and   Recommendations(SBAR). Information from the following report(s) SBAR, Procedure Summary, Intake/Output, MAR and Recent Results was reviewed with the receiving nurse. 0255- Pt joined at bedside by FOB and baby. Baby swaddled, supine in bassinet. Verbal permission obtained from pt to discuss plan of care and perform assessment with FOB present. Pt denies any drug known drug allergies. AAOx4. Vital signs stable. Pain 3/10, will continue to monitor. Educated on pain management and medications available, plan of care, signs and symptoms to report, and keeping gripper socks on while ambulating. Instructed pt to call nursing for assistance to restroom for void. Whiteboard updated. Breath sounds clear bilaterally. Fundus firm at U, midline, scant rubra lochia. No clots noted. Pt denies any headache, visual disturbances, or epigastric pain. INT present, flushed, capped, and patent. Pt educated on infection control and prevention and visitor policy. MOB and FOB instructed to call nursing to obtain baby BS before feeds per protocol. MOB and FOB educated on bulb syringe use, baby feeding frequency, recording I/O, SIDs risks and preventive measures, and safe sleep-verbalizes understanding. No further questions on concerns at this time. Assessment complete. Call bell within reach. Bed in lowest position. 0330- Pt resting comfortable supine in bed, awake and alert. No needs expressed at this time. Bed in lowest position. Call bell within reach. 0510- Pt resting comfortable supine in bed, awake and alert. Fundus firm at U, midline, scant rubra lochia. No clots noted. Pt denies any headache, visual disturbances, or epigastric pain. No needs expressed at this time. Bed in lowest position. Call bell within reach. 0577- Pt resting comfortable supine in bed, awake and alert. No needs expressed at this time. Bed in lowest position. Call bell within reach. 0715- Bedside and Verbal shift change report given to LYUBOV Medina RN (oncoming nurse) by Sri Scott RN (offgoing nurse). Report included the following information SBAR, Kardex, Intake/Output, MAR and Recent Results. Opportunities for questions was provided.      Patient Vitals for the past 4 hrs:   Temp Pulse Resp BP SpO2   05/05/20 0258 98.7 °F (37.1 °C) 81 20 109/64 99 %

## 2020-05-05 NOTE — LACTATION NOTE
This note was copied from a baby's chart. [id number] per mom,  has been falling asleep after a few sucks at the breast. Discussed with parents normal DOL expectations/behaviors. Both verbalized understanding.  did latch successfully and deep while LC was in the room, but even with stimulation quickly fell asleep. Will page for assistance as needed. 2200 mom asleep at this time. Per FOB,  has latched and fed well several times this evening. Will page for assistance if needed.

## 2020-05-05 NOTE — LACTATION NOTE
Per mom, infant latching and nursing well. Mom educated on breastfeeding basics--hunger cues, feeding on demand, waking baby if baby sleeps too long between feeds, importance of skin to skin, positioning and latching, risk of pacifier use and supplemental feedings, and importance of rooming in--and use of log sheet. Mom also educated on benefits of breastfeeding for herself and baby. Mom verbalized understanding. No questions at this time. 1240 Infant latched and nursing well for 20 minutes.

## 2020-05-05 NOTE — PROGRESS NOTES
Problem: Patient Education: Go to Patient Education Activity  Goal: Patient/Family Education  Outcome: Progressing Towards Goal     Problem: Pain  Goal: *Control of Pain  Outcome: Progressing Towards Goal     Problem: Patient Education: Go to Patient Education Activity  Goal: Patient/Family Education  Outcome: Progressing Towards Goal     Problem: Falls - Risk of  Goal: *Absence of Falls  Description: Document Valentin Menjivar Fall Risk and appropriate interventions in the flowsheet.   Outcome: Progressing Towards Goal  Note: Fall Risk Interventions:            Medication Interventions: Patient to call before getting OOB, Teach patient to arise slowly    Elimination Interventions: Patient to call for help with toileting needs, Toilet paper/wipes in reach, Call light in reach              Problem: Patient Education: Go to Patient Education Activity  Goal: Patient/Family Education  Outcome: Progressing Towards Goal

## 2020-05-05 NOTE — ANESTHESIA POSTPROCEDURE EVALUATION
5/5/2020  10:03 AM    Laboring Epidural Follow-up Note     Referring physician: Panda Hernandez,*   Patient status post vaginal delivery with labor epidural    Visit Vitals  /62 (BP 1 Location: Right arm, BP Patient Position: At rest)   Pulse 75   Temp 36.8 °C (98.2 °F)   Resp 18   LMP 07/26/2019 (Approximate)   SpO2 97%   Breastfeeding Unknown       Epidural removed by L&D staff  No sedation, pruritis noted. Adequate analgesia.   No obvious anesthesia complications          Ricky Hoyos, CRNA

## 2020-05-05 NOTE — DISCHARGE SUMMARY
Obstetrical Discharge Summary     Name: Esther [facility] MRN: [id number]  SSN: [id number]    YOB: 1985  Age: 30 y.o. Sex: female      Admit Date: 2020    Discharge Date: 20    Admitting Physician: Viktoria Skinner MD     Attending Physician:  Pascual Mohs, MD     Admission Diagnoses: Supervision of normal IUP (intrauterine pregnancy) in multigravida, third trimester [Z34.83]    Discharge Diagnoses:   Information for the patient's : Patrick Jha [[id number]]   Delivery of a 3.89 kg female infant via Vaginal, Spontaneous on 2020 at 11:21 PM  by [facility],. Apgars were 8  and 9 . Additional Diagnoses:   Hospital Problems  Date Reviewed: 2020          Codes Class Noted POA    Supervision of normal IUP (intrauterine pregnancy) in multigravida, third trimester ICD-10-CM: Z34.83  ICD-9-CM: V22.1  2020 Unknown             Lab Results   Component Value Date/Time    Rubella, External immune 2019    GrBStrep, External negative 2020     Immunization(s):   Immunization History   Administered Date(s) Administered    Influenza Vaccine (Quad) 2019      Hospital Course: Normal hospital course following the delivery. Patient Instructions:   Current Discharge Medication List      START taking these medications    Details   ibuprofen (MOTRIN) 800 mg tablet Take 1 Tab by mouth every eight (8) hours as needed for Pain. Qty: 60 Tab, Refills: 1         CONTINUE these medications which have NOT CHANGED    Details   PNV66-Iron Fumarate-FA-DSS-DHA 26-1.2- mg cap Take  by mouth. Reference my discharge instructions.     Follow-up Appointments   Procedures    FOLLOW UP VISIT Appointment in: 6 Weeks     Standing Status:   Standing     Number of Occurrences:   1     Order Specific Question:   Appointment in     Answer:   6 Weeks      Signed By:  Katja Monroe CNM     May 5, 2020

## 2020-05-05 NOTE — PROGRESS NOTES
1905- Bedside and Verbal shift change report given to CLIVE Beard RN (oncoming nurse) by Pato Medina RN (offgoing nurse). Report included the following information SBAR, Kardex, Intake/Output, MAR and Recent Results. 2245- Pt joined at bedside by FOB and baby. Baby swaddled, supine in bassinet. Verbal permission obtained from pt to discuss plan of care and perform assessment with FOB present. Pt denies any drug known drug allergies. AAOx4. Vital signs stable. Pain 3/10, declines interventions, will continue to monitor. Educated on pain management and medications available, plan of care, signs and symptoms to report, and keeping gripper socks on while ambulating. Encouraged pt to increase ambulation time within the unit. Whiteboard updated. Breath sounds clear bilaterally. Pt states she is voiding, passing flatus, and last BM was 5/3/2020. +1, nonpitting, BLE edema. Fundus firm at U -1, midline, small rubra lochia. No clots noted. Pt denies any headache, visual disturbances, or epigastric pain. IV removed, tip intact. Pt educated on infection control and prevention and visitor policy. MOB confirms follow up pediatrician to be [facility] with a follow up appointment scheduled for 5/7/2020 at 0830. MOB and FOB educated on bulb syringe use, baby feeding frequency, recording I/O, SIDs risks and preventive measures, and safe sleep-verbalizes understanding. No further questions on concerns at this time. Assessment complete. Call bell within reach. Bed in lowest position. Visit Vitals  /61 (BP 1 Location: Right arm, BP Patient Position: At rest)   Pulse 67   Temp 97.9 °F (36.6 °C)   Resp 16     2335- Baby transported to nursery swaddled, supine in bassinet for assessment and discharge testing. Baby hospital bands and HUGs tag secure, present, and verified with MOB prior to leaving room. MOB verbalizes awareness of where the nursery is located.     0010- Baby transported supine in bassinet back to rooming in. Baby bands present, secure, and verified with MOB upon arrival. Discharge testing results reviewed with MOB and FOB. Niharika.Borne- Discharge teaching completed with pt and FOB. Baby care given including but not limited to:  screening of MST, TCB, and Hearing screen tests. Common infant rashes milia, cradle cap. Informed to call pediatrician if baby develops yellowing of the skin or eyes. Discussed care or respiratory system with bulb syring, how to turn baby and burp to clear airway. Recommended infant CPR class. Keep umbilical cord dry, if becomes soiled okay to clean with alcohol pads, cord will fall off in 5-10 days. SIDS and Shaken baby syndrome discussed. Bathe infant via sponge bath until cord is fallen off and healed. Safe sleeping, car seat education provided. Pt postpartum care provided to include: Breastfeeding, refrain from pacifier or bottle use for 2-4 weeks until good breast feeding is established, if breast get full and warm, hand express or machine express. Keep feeding infant if engorgement or mastitis occurs or it may get worse. Breast feeding nutrient guide given. Discussed lochia bleeding, call Dr. If there is an increase in bleeding or several clots being passed, any foul odors or abnormal discharge colors. Nothing in the vagina for at least 6 weeks until postpartum visit. Discussed cramping and  vaginal pain. Discussed baby blues, emotional changes, and signs of postpartum depression. Seek medical help ASAP if thoughts of harming self or baby. All questions reviewed at this time. 0120- Baby placed on phototherapy blanket. Reviewed phototherapy indications with MOB and FOB. Instructed MOB and FOB to keep baby on blanket at all times and encouraged breastfeeding to continue while on blanket. Encouraged MOB to increase feeding frequency. All questions addressed at this time. No needs expressed at this time. Bed in lowest position. Call bell within reach. 0340- Fundus firm at U -1, midline, small rubra lochia. No clots noted. Pt denies any headache, visual disturbances, or epigastric pain. No needs expressed at this time. Bed in lowest position. Call bell within reach. 0550- Pt resting comfortable supine in bed, awake and alert. No needs expressed at this time. Bed in lowest position. Call bell within reach. 0730- Bedside and Verbal shift change report given to Laura Gomez RN and Stefani Phillips RN (oncoming nurse) by Neela Bustillo RN (offgoing nurse). Report included the following information SBAR, Kardex, Intake/Output, MAR and Recent Results. Opportunities for questions was provided. Patient Vitals for the past 8 hrs:   Temp Pulse Resp BP SpO2   05/05/20 2245 97.9 °F (36.6 °C) 67 16 106/61 100 %       0740- Pain rated 5/10, medication administered per order. Oncoming nurses notified. Bed in lowest position. Call bell within reach.

## 2020-05-05 NOTE — PROGRESS NOTES
Progress Note    Patient: Fabi Sanders MRN: [id number]  SSN: [id number]    YOB: 1985  Age: 30 y.o. Sex: female      Subjective:     Postpartum Day: 1 Vaginal Delivery    The patient is without complaints. The patient is ambulating well. The patient is tolerating a normal diet. The baby is well. Objective:      Patient Vitals for the past 8 hrs:   BP Temp Pulse Resp SpO2   05/05/20 0823 100/62 98.2 °F (36.8 °C) 75 18 97 %     LABS: No results found for this or any previous visit (from the past 24 hour(s)). Lab Results   Component Value Date/Time    HGB 13.8 05/04/2020 06:40 AM     Lab Results   Component Value Date/Time    HCT 40.6 05/04/2020 06:40 AM      Lochia:  appropriate   Uterine Fundus:   firm, 0     Lab/Data Review: All lab results for the last 24 hours reviewed. Assessment:     Status post: Doing well postpartum vaginal delivery day 1. Plan:     Continue day 1 post-vaginal delivery orders. Postpartum care discussed including diet, ambulation, and actvitiy restrictions. Plan for discharge home tomorrow.      Signed By: Darwin Quiroz CNM     May 5, 2020

## 2020-05-05 NOTE — PROGRESS NOTES
0715 Bedside and Verbal shift change report given to LYUBOV Medina RN (oncoming nurse) by Henny Rocha RN (offgoing nurse). Report included the following information SBAR, Kardex, Procedure Summary, Intake/Output, MAR and Recent Results. 4321 Shift assessment complete. Patient denies headache, visual disturbances, and right epigastic pain at this time. Breath sounds clear bilaterally. Patient passing gas, bowel sounds active, with last BM being 20. Fundus firm at U with small lochia, no clots noted on assessment. IV site clean dry and intact. Free of edema in upper extremities, trace in LLE and +1 non-pitting RLE in lower extremities. Patient free of clonus in lower extremities and denying any pain/tenderness behind knees or in calves. Patient rating pain 0/10, but 3 when moving, declining medication at this time. Patient educated to notify nursing staff of: SOB, chest pain/heaviness, blurred vision, lightheadedness, increase in bleeding, and passing of clots, patient verbalized understanding. 200 Rounding complete, mother sleeping, chest rise and fall noted    1133 Assisted mother with waking  to feed and attempted to latch, but  too sleepy. Theodore placed skin to skin with mother for 30 minutes, will notify lactation as mother is requesting    1250 Rounding complete, lactation at bedside assisting with feeding     1346 Rounding complete, mother sleeping, chest rise and fall noted     1612 2 tablets Motrin given for pain 4/10    1620 Shift assessment complete. Patient denies headache, visual disturbances, and right epigastic pain at this time. Breath sounds clear bilaterally. Fundus firm at U-1 with small lochia, no clots noted on assessment. Patient rating pain 4/10 and 2 tablets motrin given. All other assessment findings the same as previous assessment    1733 Rounding complete, mother in restroom    1905 Bedside and Verbal shift change report given to LYUBOV Nelson RN (oncoming nurse) by Ulisses Barajas. Hounshell, RN (offgoing nurse). Report included the following information SBAR, Kardex, Procedure Summary, Intake/Output, MAR and Recent Results.

## 2020-05-05 NOTE — PROGRESS NOTES
3704-3742: Patient actively pushing during contractions. This RN and provider remain at bedside providing continuous labor support, continuously assessing patient, vital signs, fetal heart rate, contraction pattern, progress of labor and descent, adjusting EFM and TOCO and palpating abdomen and contractions as needed. 0030: Breastfeeding teaching provided, pt verbalized understanding and teach back provided    0140: Patient has non-skid socks on and assessment completed, assisted up out of bed and ambulated to bathroom without difficulty. Patient voided urine. Marina care and teaching, ice marina pad, pad, mesh panties provided. 0255: TRANSFER - OUT REPORT:    Verbal report given to LYUBOV Barney rn(name) on [facility]  being transferred to [facility](unit) for routine progression of care       Report consisted of patients Situation, Background, Assessment and   Recommendations(SBAR). Information from the following report(s) SBAR, Intake/Output, MAR, Accordion, Recent Results, Med Rec Status, Alarm Parameters  and Quality Measures was reviewed with the receiving nurse. Lines:   Peripheral IV 05/04/20 Right Hand (Active)   Site Assessment Clean, dry, & intact 5/4/2020  4:30 PM   Phlebitis Assessment 0 5/4/2020  4:30 PM   Infiltration Assessment 0 5/4/2020  4:30 PM   Dressing Status Clean, dry, & intact 5/4/2020  4:30 PM   Dressing Type Transparent;Tape 5/4/2020  4:30 PM   Hub Color/Line Status Pink 5/4/2020  4:30 PM   Alcohol Cap Used Yes 5/4/2020  4:30 PM        Opportunity for questions and clarification was provided.       Patient transported with:   Registered Nurse

## 2020-05-05 NOTE — PROGRESS NOTES
Problem: Patient Education: Go to Patient Education Activity  Goal: Patient/Family Education  Outcome: Progressing Towards Goal     Problem: Pain  Goal: *Control of Pain  Outcome: Progressing Towards Goal     Problem: Patient Education: Go to Patient Education Activity  Goal: Patient/Family Education  Outcome: Progressing Towards Goal     Problem: Falls - Risk of  Goal: *Absence of Falls  Description: Document Leafy Dugan Fall Risk and appropriate interventions in the flowsheet.   Outcome: Progressing Towards Goal  Note: Fall Risk Interventions:            Medication Interventions: Teach patient to arise slowly    Elimination Interventions: Call light in reach              Problem: Patient Education: Go to Patient Education Activity  Goal: Patient/Family Education  Outcome: Progressing Towards Goal     Problem: Vaginal Delivery: Postpartum Day 1  Goal: Activity/Safety  Outcome: Progressing Towards Goal  Goal: Diagnostic Test/Procedures  Outcome: Progressing Towards Goal  Goal: Nutrition/Diet  Outcome: Progressing Towards Goal  Goal: Discharge Planning  Outcome: Progressing Towards Goal  Goal: Medications  Outcome: Progressing Towards Goal  Goal: Treatments/Interventions/Procedures  Outcome: Progressing Towards Goal  Goal: Psychosocial  Outcome: Progressing Towards Goal  Goal: *Vital signs within defined limits  Outcome: Progressing Towards Goal  Goal: *Labs within defined limits  Outcome: Progressing Towards Goal  Goal: *Hemodynamically stable  Outcome: Progressing Towards Goal  Goal: *Optimal pain control at patient's stated goal  Outcome: Progressing Towards Goal  Goal: *Participates in infant care  Outcome: Progressing Towards Goal  Goal: *Demonstrates progressive activity  Outcome: Progressing Towards Goal  Goal: *Performs self perineal care  Outcome: Progressing Towards Goal  Goal: *Appropriate parent-infant bonding  Outcome: Progressing Towards Goal  Goal: *Tolerating diet  Outcome: Progressing Towards Goal  Goal: *Performs self breast care  Outcome: Progressing Towards Goal

## 2020-05-05 NOTE — L&D DELIVERY NOTE
Delivery Note    Obstetrician:  Leonila Guerrier CNM    Assistant: none    Pre-Delivery Diagnosis: Term pregnancy, Spontaneous labor and Single fetus    Post-Delivery Diagnosis: Living  infant(s) and Female    Intrapartum Event: Febrile (greater than 100.4°F) and Meconium    Procedure: Spontaneous vaginal delivery    Epidural: YES    Monitor:  Fetal Heart Tones - External and Uterine Contractions - External    Indications for instrumental delivery: none    Estimated Blood Loss: 200    Episiotomy: none    Laceration(s):  2nd degree    Laceration(s) repair: YES    Presentation: Cephalic    Fetal Description: meza    Fetal Position: Occiput Anterior    Birth Weight: not yet assessed    Birth Length: not yet assessed    Apgar - One Minute: 8    Apgar - Five Minutes: 9    Umbilical Cord: 3 vessels present  Specimens: placenta delivered intact  Complications:  maternal temperature           Cord Blood Results:   Information for the patient's :   Anamarkeloy Pearson [[id number]]   No results found for: PCTABR, PCTDIG, BILI, ABORH    Prenatal Labs:     Lab Results   Component Value Date/Time    ABO/Rh(D) B POSITIVE 2020 06:40 AM    HBsAg, External negative 2019    Rubella, External immune 2019    GrBStrep, External negative 2020        Attending Attestation: I was present and scrubbed for the entire procedure

## 2020-05-06 VITALS
RESPIRATION RATE: 18 BRPM | OXYGEN SATURATION: 100 % | SYSTOLIC BLOOD PRESSURE: 115 MMHG | HEART RATE: 73 BPM | TEMPERATURE: 98.5 F | DIASTOLIC BLOOD PRESSURE: 62 MMHG

## 2020-05-06 LAB
HCT VFR BLD AUTO: 40.5 % (ref 35–45)
HGB BLD-MCNC: 13.9 G/DL (ref 12–16)

## 2020-05-06 PROCEDURE — 74011250637 HC RX REV CODE- 250/637: Performed by: ADVANCED PRACTICE MIDWIFE

## 2020-05-06 PROCEDURE — 36415 COLL VENOUS BLD VENIPUNCTURE: CPT

## 2020-05-06 PROCEDURE — 85014 HEMATOCRIT: CPT

## 2020-05-06 PROCEDURE — 85018 HEMOGLOBIN: CPT

## 2020-05-06 RX ADMIN — IBUPROFEN 800 MG: 400 TABLET, FILM COATED ORAL at 07:40

## 2020-05-06 NOTE — LACTATION NOTE
Per mom, infant latching and nursing well. Breastfeeding discharge teaching completed to include feeding on demand, foremilk and hindmilk importance, engorgement, mastitis, clogged ducts, pumping, breastmilk storage, and returning to work. Information given about unit and office phone numbers and encouraged mom to reach out if concerns arise, but that [facility] would be calling her in the next few days to follow up on breastfeeding. Mom verbalized understanding and no questions at this time. 1230 Set mom up with double electric breast pump and educated on how to use initiation mode, pump hygiene, and safe milk storage due to infant on phototherapy. Mom to pump q 3 hours for 15 minutes on initiation mode. Mom verbalized understanding and no questions at this time.

## 2020-05-06 NOTE — DISCHARGE INSTRUCTIONS
Patient Education        Learning About Starting to Breastfeed  Planning ahead    Before your baby is born, plan ahead. Learn all you can about breastfeeding. This helps make breastfeeding easier. · Early in your pregnancy, talk to your doctor or midwife about breastfeeding. · Learn the basics before your baby is born. The staff at hospitals and birthing centers can help you find a lactation specialist. This person is often a nurse who has been trained to teach and advise women about breastfeeding. Or you can take a breastfeeding class. · Plan ahead for times when you will need help after your baby is born. Many women get help from friends and family. Some join a support group to talk to other moms who breastfeed. · Buy the equipment you'll need. Examples are breast pads, nipple cream, extra pillows, and nursing bras. Find out about breast pumps too. Getting help from your hospital or birthing center  It's important to have support from the doctors, nurses, and hospital staff who care for you and your baby. Before it's time for you to give birth, ask about the breastfeeding policies at your hospital or birthing center. Look for a hospital or birthing center that has policies for:  · \"Rooming in. \" This policy encourages you to have your baby in the room with you. It can allow you to breastfeed more often. · Supplemental feedings. Tell the staff that your baby is to get only your breast milk from birth. If staff feed your baby water, sugar solution, or formula right after birth without a medical reason, it may make it harder for you to breastfeed. · Pacifiers or artificial nipples. Staff should not give your  these items. They may interfere with breastfeeding. · Follow-up. Find out if your hospital can help you with breastfeeding issues after you go home. See if you can get information on support groups or other contacts.  They might help if you need help setting up and staying with your breastfeeding routine. Your first feeding  It's best to start breastfeeding within 1 hour of birth. For each feeding, you go through these basic steps:  · Get ready for the feeding. Be calm and relaxed, and try not to be distracted. Get some water or juice for yourself. Use two or three pillows to help support your baby while he or she is nursing. · Find a breastfeeding position that is comfortable for you and your baby. Examples are the cradle and the football positions. Make sure the baby's head and chest are lined up straight and facing your breast. It's best to switch which breast you start with each time. · Get the baby latched on well. Your baby's mouth needs to be wide open, like a yawn, so you may need to gently touch the middle of your baby's lower lip. When your baby's mouth is open wide, quickly bring the baby onto your nipple and areola. The areola is the dark [place] around your nipple. · Provide a complete feeding. Let your baby decide how long to nurse. Be sure to burp your baby after each breast.  In the first days after birth, your breasts make a thick, yellow liquid called colostrum. This liquid gives your baby nutrients and antibodies against infection. It is all that babies need at first. Your breasts will fill with milk a few days after the birth. Talk to your doctor, midwife, or lactation specialist right away if you are having problems and aren't sure what to do. How often to breastfeed  Plan to breastfeed your baby on demand rather than setting a strict schedule. For the first 2 weeks, be prepared to breastfeed every 1 to 3 hours. That often works out to about 8 to 12 times in a 24-hour period. In the first few days, you may need to wake a sleeping baby to feed. If you breastfeed more often, it will help your breasts to produce more milk. After you go home  After you're home, don't be afraid to call your doctor, midwife, or lactation specialist with questions.  That's true even if you don't know what's bothering you. They are used to parents of newborns calling. They can help you figure out if there is a problem, and if so, how to fix it. Plan for times when you will be apart from your baby. Use a breast pump to collect breast milk ahead of time. You can store milk in the refrigerator or freezer. Then it's ready when someone else will be taking care of your baby. Experts recommend waiting about a month until breastfeeding is going well before offering a bottle. Breastfeeding is a learned skill that gets easier over time. You are more likely to succeed if you plan ahead, learn the basic techniques, and know where to get help and support. Where can you learn more? Go to http://alex-lili.info/  Enter Q917 in the search box to learn more about \"Learning About Starting to Breastfeed. \"  Current as of: May 29, 2019Content Version: 12.4  © 4572-6926 [employer]. Care instructions adapted under license by [employer] (which disclaims liability or warranty for this information). If you have questions about a medical condition or this instruction, always ask your healthcare professional. [place] [address] any warranty or liability for your use of this information. Patient Education        Learning About Birth Control After Childbirth  What is birth control? Birth control is any method used to prevent pregnancy. Another word for birth control is contraception. Wait until you're healed (about 4 to 6 weeks) before you have sexual intercourse. If you have sex without birth control, there is a chance that you could get pregnant. This is true even if you haven't started having periods again. Even if you breastfeed, you can still get pregnant. Most experts suggest waiting at least 18 months to get pregnant again to reduce risks for you and the baby.  There may be reasons for you to get pregnant sooner, so talk with your doctor about the risks and benefits. The only sure way to prevent another pregnancy is to not have sex. But finding a good method of birth control that you are comfortable with can help you avoid an unplanned pregnancy. Your doctor can help you choose the birth control method that is right for you. What are the types of birth control? · Long-acting reversible contraception (LARC) is the most effective reversible method you can use to prevent pregnancy. If you decide you want to get pregnant, you can have them removed. LARCs are implants and intrauterine devices (IUDs). While they are being used, they usually prevent pregnancy for years. ? Implants are placed under the skin of the arm. This can be done right after you give birth. They release the hormone progestin and prevent pregnancy for about 3 years. ? IUDs are placed in the uterus by a doctor. This can be done right after you give birth, if you and your doctor discuss it beforehand. Or it can be done at a doctor visit later. There are two main types of IUDs--the copper IUD and the hormonal IUD. The hormonal IUD releases progestin. IUDs prevent pregnancy for 3 to 10 years, depending on the type. · Hormonal methods are very good at preventing pregnancy. Combination birth control pills (\"the pill\"), skin patches, and vaginal rings release the hormones estrogen and progestin. Depo-Provera is a shot you get every 3 months. Shots, mini-pills, IUDs, and implants release progestin only. It's best to use progestin-only options in the first few weeks after giving birth. · Barrier methods don't prevent pregnancy as well as implants, IUDs, or hormonal methods do. Barrier methods include condoms, diaphragms, and cervical caps. You must use barrier methods every time you have sex. If you had a diaphragm or cervical cap before you got pregnant, talk to your doctor to see if you need a different size. Condoms can be used anytime after you give birth.   · Natural family planning is also known as fertility awareness or the rhythm method. It can work if you and your partner are very careful and you have a regular ovulation cycle. But it doesn't work better than other birth control methods. You will need to keep good records so you know when you are most likely to become pregnant. And during those times, you will need to use a barrier method or not have sex. · Permanent birth control (sterilization) gives you lasting protection against pregnancy. A man can have a vasectomy. A woman can have her tubes tied (tubal ligation). But this is only a good choice if you are sure that you don't want any more children. · Emergency contraception is a backup method to prevent pregnancy if you didn't use birth control or if a condom breaks. The most effective emergency contraception is prescribed by a doctor. This includes the copper IUD (inserted by a doctor) or a prescription pill. You can also get emergency contraceptive pills without a prescription at most drugstores. How can you get birth control? · You can buy:  ? Condoms and spermicides without a prescription in drugstores, online, and in many grocery stores. ? Some forms of emergency contraception without a prescription at most drugstores. · You need to see a doctor or visit family planning clinic to:  ? Get a prescription for birth control pills and other methods that use hormones. ? Have an implant or IUD inserted, including the type of IUD used for emergency contraception. ? Get a hormone shot. ? Get a prescription for a diaphragm or cervical cap. ? Get a prescription for certain kinds of emergency contraception. Follow-up care is a key part of your treatment and safety. Be sure to make and go to all appointments, and call your doctor if you are having problems. It's also a good idea to know your test results and keep a list of the medicines you take. Where can you learn more?   Go to http://alex-lili.info/  Enter [id number] in the search box to learn more about \"Learning About Birth Control After Childbirth. \"  Current as of: May 29, 2019Content Version: 12.4  © 0481-6571 [employer]. Care instructions adapted under license by [employer] (which disclaims liability or warranty for this information). If you have questions about a medical condition or this instruction, always ask your healthcare professional. [address] any warranty or liability for your use of this information. POST DELIVERY DISCHARGE INSTRUCTIONS    Name: Concha Spicer  YOB: 1985  Primary Diagnosis: Active Problems:    Supervision of normal IUP (intrauterine pregnancy) in multigravida, third trimester (5/4/2020)        General:     Diet/Diet Restrictions:  Eight 8-ounce glasses of fluid daily (water, juices); avoid excessive caffeine intake. Meals/snacks as desired which are high in fiber and carbohydrates and low in fat and cholesterol. Physical Activity / Restrictions / Safety:     Avoid heavy lifting, no more that 8 lbs. For 2-3 weeks; limit use of stairs to 2 times daily for the first week home. No driving for one week. Avoid intercourse 4-6 weeks, no douching or tampon use. Check with obstetrician before starting or resuming an exercise program.         Discharge Instructions/Special Treatment/Home Care Needs:     Continue prenatal vitamins. Continue to use squirt bottle with warm water on your episiotomy after each bathroom use until bleeding stops. If steri-strips applied to your incision, remove in 7-10 days. Call your doctor for the following:     Fever over 101 degrees by mouth. Vaginal bleeding heavier than a normal menstrual period or clot larger than a golf ball. Red streaks or increased swelling of legs, painful red streaks on your breast.  Painful urination, constipation and increased pain or swelling or discharge with your incision.   If you feel extremely anxious or overwhelmed. If you have thoughts of harming yourself and/or your baby. Pain Management:     Pain Management:   Take Acetaminophen (Tylenol) or Ibuprofen (Advil, Motrin), as directed for pain. Use a warm Sitz bath 3 times daily to relieve episiotomy or hemorrhoidal discomfort. Heating pad to  incision as needed. For hemorrhoidal discomfort, use Tucks and Anusol cream as needed and directed. Follow-Up Care: These are general instructions for a healthy lifestyle:    No smoking/ No tobacco products/ Avoid exposure to second hand smoke    Surgeon General's Warning:  Quitting smoking now greatly reduces serious risk to your health. Obesity, smoking, and sedentary lifestyle greatly increases your risk for illness    A healthy diet, regular physical exercise & weight monitoring are important for maintaining a healthy lifestyle    Recognize signs and symptoms of STROKE:    F-face looks uneven    A-arms unable to move or move unevenly    S-speech slurred or non-existent    T-time-call 911 as soon as signs and symptoms begin-DO NOT go       Back to bed or wait to see if you get better-TIME IS BRAIN.

## 2020-05-06 NOTE — PROGRESS NOTES
Bedside shift report given to Cat Ngo RN (Oncoming Nurse) by Jade Santos RN  ([address] Nurse). Report included Kardex, MAR and SBAR information.

## 2020-05-06 NOTE — PROGRESS NOTES
Problem: Patient Education: Go to Patient Education Activity  Goal: Patient/Family Education  Outcome: Progressing Towards Goal     Problem: Pain  Goal: *Control of Pain  Outcome: Progressing Towards Goal     Problem: Patient Education: Go to Patient Education Activity  Goal: Patient/Family Education  Outcome: Progressing Towards Goal     Problem: Falls - Risk of  Goal: *Absence of Falls  Description: Document Rosalina Rubio Fall Risk and appropriate interventions in the flowsheet.   Outcome: Progressing Towards Goal     Problem: Patient Education: Go to Patient Education Activity  Goal: Patient/Family Education  Outcome: Progressing Towards Goal     Problem: Vaginal Delivery: Postpartum 2  Goal: Activity/Safety  Outcome: Progressing Towards Goal  Goal: Consults, if ordered  Outcome: Progressing Towards Goal  Goal: Nutrition/Diet  Outcome: Progressing Towards Goal  Goal: Discharge Planning  Outcome: Progressing Towards Goal  Goal: Medications  Outcome: Progressing Towards Goal  Goal: Treatments/Interventions/Procedures  Outcome: Progressing Towards Goal  Goal: Psychosocial  Outcome: Progressing Towards Goal     Problem: Vaginal Delivery: Discharge Outcomes  Goal: *Verbalizes name, dosage, time, side effects, and number of days to continue medications  Outcome: Progressing Towards Goal  Goal: *Describes available resources and support systems  Outcome: Progressing Towards Goal  Goal: *No signs and symptoms of infection  Outcome: Progressing Towards Goal  Goal: *Birth certificate information completed  Outcome: Progressing Towards Goal  Goal: *Received and verbalizes understanding of discharge plan and instructions  Outcome: Progressing Towards Goal  Goal: *Vital signs within defined limits  Outcome: Progressing Towards Goal  Goal: *Labs within defined limits  Outcome: Progressing Towards Goal  Goal: *Hemodynamically stable  Outcome: Progressing Towards Goal  Goal: *Optimal pain control at patient's stated goal  Outcome: Progressing Towards Goal  Goal: *Participates in infant care  Outcome: Progressing Towards Goal  Goal: *Demonstrates progressive activity  Outcome: Progressing Towards Goal  Goal: *Appropriate parent-infant bonding  Outcome: Progressing Towards Goal  Goal: *Tolerating diet  Outcome: Progressing Towards Goal

## 2020-05-06 NOTE — LACTATION NOTE
This note was copied from a baby's chart. 5 per mom,  has been sleepy but latching well. Mom stated she has also been pumping and syringe feeding what has been expressed. Breastfeeding discharge teaching completed to include feeding on demand, foremilk and hindmilk importance, engorgement, mastitis, clogged ducts, pumping, breastmilk storage, and returning to work. Information given about unit and office phone numbers and encouraged mom to reach out if concerns arise, but that  [facility] would be calling her in the next few days to follow up on breastfeeding. Mom verbalized understanding and no questions at this time. Will page for assistance if needed.

## 2021-07-16 PROBLEM — O09.521 AMA (ADVANCED MATERNAL AGE) MULTIGRAVIDA 35+, FIRST TRIMESTER: Status: ACTIVE | Noted: 2021-07-16

## 2021-07-16 LAB
CHLAMYDIA, EXTERNAL: NEGATIVE
HBSAG, EXTERNAL: NEGATIVE
HIV, EXTERNAL: NEGATIVE
N. GONORRHEA, EXTERNAL: NEGATIVE
RPR, EXTERNAL: NON REACTIVE
RUBELLA, EXTERNAL: NORMAL

## 2022-01-24 LAB — GRBS, EXTERNAL: NEGATIVE

## 2022-02-14 ENCOUNTER — ANESTHESIA EVENT (OUTPATIENT)
Dept: LABOR AND DELIVERY | Age: 37
End: 2022-02-14
Payer: COMMERCIAL

## 2022-02-14 ENCOUNTER — ANESTHESIA (OUTPATIENT)
Dept: LABOR AND DELIVERY | Age: 37
End: 2022-02-14
Payer: COMMERCIAL

## 2022-02-14 ENCOUNTER — HOSPITAL ENCOUNTER (INPATIENT)
Age: 37
LOS: 3 days | Discharge: HOME | End: 2022-02-17
Attending: OBSTETRICS & GYNECOLOGY | Admitting: OBSTETRICS & GYNECOLOGY
Payer: COMMERCIAL

## 2022-02-14 PROBLEM — O36.63X0 EXCESSIVE FETAL GROWTH AFFECTING MANAGEMENT OF PREGNANCY IN THIRD TRIMESTER: Status: ACTIVE | Noted: 2022-02-14

## 2022-02-14 PROBLEM — Z3A.39 PREGNANCY WITH 39 COMPLETED WEEKS GESTATION: Status: ACTIVE | Noted: 2022-02-14

## 2022-02-14 LAB
ABO + RH BLD: NORMAL
BASOPHILS # BLD: 0 K/UL (ref 0–0.1)
BASOPHILS NFR BLD: 0 % (ref 0–2)
BLOOD GROUP ANTIBODIES SERPL: NORMAL
DIFFERENTIAL METHOD BLD: ABNORMAL
EOSINOPHIL # BLD: 0.1 K/UL (ref 0–0.4)
EOSINOPHIL NFR BLD: 1 % (ref 0–5)
ERYTHROCYTE [DISTWIDTH] IN BLOOD BY AUTOMATED COUNT: 13.8 % (ref 11.6–14.5)
HCT VFR BLD AUTO: 39.3 % (ref 35–45)
HGB BLD-MCNC: 13.3 G/DL (ref 12–16)
IMM GRANULOCYTES # BLD AUTO: 0.1 K/UL (ref 0–0.04)
IMM GRANULOCYTES NFR BLD AUTO: 1 % (ref 0–0.5)
LYMPHOCYTES # BLD: 2.3 K/UL (ref 0.9–3.6)
LYMPHOCYTES NFR BLD: 26 % (ref 21–52)
MCH RBC QN AUTO: 30.6 PG (ref 24–34)
MCHC RBC AUTO-ENTMCNC: 33.8 G/DL (ref 31–37)
MCV RBC AUTO: 90.6 FL (ref 78–100)
MONOCYTES # BLD: 0.7 K/UL (ref 0.05–1.2)
MONOCYTES NFR BLD: 8 % (ref 3–10)
NEUTS SEG # BLD: 5.7 K/UL (ref 1.8–8)
NEUTS SEG NFR BLD: 64 % (ref 40–73)
NRBC # BLD: 0 K/UL (ref 0–0.01)
NRBC BLD-RTO: 0 PER 100 WBC
PLATELET # BLD AUTO: 130 K/UL (ref 135–420)
PMV BLD AUTO: 11.9 FL (ref 9.2–11.8)
RBC # BLD AUTO: 4.34 M/UL (ref 4.2–5.3)
SPECIMEN EXP DATE BLD: NORMAL
WBC # BLD AUTO: 8.9 K/UL (ref 4.6–13.2)

## 2022-02-14 PROCEDURE — 86900 BLOOD TYPING SEROLOGIC ABO: CPT

## 2022-02-14 PROCEDURE — 77030010848 HC CATH INTUTR PRSS KOLB -B

## 2022-02-14 PROCEDURE — 74011250636 HC RX REV CODE- 250/636

## 2022-02-14 PROCEDURE — 74011000258 HC RX REV CODE- 258

## 2022-02-14 PROCEDURE — 74011250636 HC RX REV CODE- 250/636: Performed by: NURSE ANESTHETIST, CERTIFIED REGISTERED

## 2022-02-14 PROCEDURE — 3E033VJ INTRODUCTION OF OTHER HORMONE INTO PERIPHERAL VEIN, PERCUTANEOUS APPROACH: ICD-10-PCS | Performed by: STUDENT IN AN ORGANIZED HEALTH CARE EDUCATION/TRAINING PROGRAM

## 2022-02-14 PROCEDURE — 74011000250 HC RX REV CODE- 250: Performed by: NURSE ANESTHETIST, CERTIFIED REGISTERED

## 2022-02-14 PROCEDURE — 65270000029 HC RM PRIVATE

## 2022-02-14 PROCEDURE — 74011250636 HC RX REV CODE- 250/636: Performed by: OBSTETRICS & GYNECOLOGY

## 2022-02-14 PROCEDURE — 10907ZC DRAINAGE OF AMNIOTIC FLUID, THERAPEUTIC FROM PRODUCTS OF CONCEPTION, VIA NATURAL OR ARTIFICIAL OPENING: ICD-10-PCS | Performed by: STUDENT IN AN ORGANIZED HEALTH CARE EDUCATION/TRAINING PROGRAM

## 2022-02-14 PROCEDURE — 85025 COMPLETE CBC W/AUTO DIFF WBC: CPT

## 2022-02-14 RX ORDER — FENTANYL/ROPIVACAINE/NS/PF 2MCG/ML-.1
PLASTIC BAG, INJECTION (ML) EPIDURAL
Status: DISPENSED
Start: 2022-02-14 | End: 2022-02-15

## 2022-02-14 RX ORDER — LIDOCAINE HYDROCHLORIDE AND EPINEPHRINE 15; 5 MG/ML; UG/ML
INJECTION, SOLUTION EPIDURAL AS NEEDED
Status: DISCONTINUED | OUTPATIENT
Start: 2022-02-14 | End: 2022-02-15 | Stop reason: HOSPADM

## 2022-02-14 RX ORDER — SODIUM CHLORIDE 0.9 % (FLUSH) 0.9 %
5-40 SYRINGE (ML) INJECTION EVERY 8 HOURS
Status: DISCONTINUED | OUTPATIENT
Start: 2022-02-14 | End: 2022-02-15 | Stop reason: HOSPADM

## 2022-02-14 RX ORDER — PHENYLEPHRINE HCL IN 0.9% NACL 1 MG/10 ML
80 SYRINGE (ML) INTRAVENOUS AS NEEDED
Status: DISCONTINUED | OUTPATIENT
Start: 2022-02-14 | End: 2022-02-15 | Stop reason: HOSPADM

## 2022-02-14 RX ORDER — NALBUPHINE HYDROCHLORIDE 10 MG/ML
10 INJECTION, SOLUTION INTRAMUSCULAR; INTRAVENOUS; SUBCUTANEOUS
Status: DISCONTINUED | OUTPATIENT
Start: 2022-02-14 | End: 2022-02-15 | Stop reason: HOSPADM

## 2022-02-14 RX ORDER — OXYTOCIN/0.9 % SODIUM CHLORIDE 30/500 ML
0-20 PLASTIC BAG, INJECTION (ML) INTRAVENOUS
Status: DISCONTINUED | OUTPATIENT
Start: 2022-02-14 | End: 2022-02-15 | Stop reason: HOSPADM

## 2022-02-14 RX ORDER — TERBUTALINE SULFATE 1 MG/ML
0.25 INJECTION SUBCUTANEOUS
Status: DISCONTINUED | OUTPATIENT
Start: 2022-02-14 | End: 2022-02-15 | Stop reason: HOSPADM

## 2022-02-14 RX ORDER — FENTANYL/ROPIVACAINE/NS/PF 2MCG/ML-.1
1-15 PLASTIC BAG, INJECTION (ML) EPIDURAL
Status: DISCONTINUED | OUTPATIENT
Start: 2022-02-14 | End: 2022-02-15 | Stop reason: HOSPADM

## 2022-02-14 RX ORDER — SODIUM CHLORIDE 0.9 % (FLUSH) 0.9 %
5-40 SYRINGE (ML) INJECTION AS NEEDED
Status: DISCONTINUED | OUTPATIENT
Start: 2022-02-14 | End: 2022-02-15 | Stop reason: HOSPADM

## 2022-02-14 RX ORDER — BUTORPHANOL TARTRATE 2 MG/ML
2 INJECTION INTRAMUSCULAR; INTRAVENOUS
Status: DISCONTINUED | OUTPATIENT
Start: 2022-02-14 | End: 2022-02-15 | Stop reason: HOSPADM

## 2022-02-14 RX ORDER — EPHEDRINE SULFATE/0.9% NACL/PF 50 MG/5 ML
10 SYRINGE (ML) INTRAVENOUS AS NEEDED
Status: DISCONTINUED | OUTPATIENT
Start: 2022-02-14 | End: 2022-02-15 | Stop reason: HOSPADM

## 2022-02-14 RX ORDER — DIPHENHYDRAMINE HYDROCHLORIDE 50 MG/ML
25 INJECTION, SOLUTION INTRAMUSCULAR; INTRAVENOUS
Status: DISCONTINUED | OUTPATIENT
Start: 2022-02-14 | End: 2022-02-15 | Stop reason: HOSPADM

## 2022-02-14 RX ORDER — FENTANYL/ROPIVACAINE/NS/PF 2MCG/ML-.1
PLASTIC BAG, INJECTION (ML) EPIDURAL
Status: COMPLETED
Start: 2022-02-14 | End: 2022-02-14

## 2022-02-14 RX ORDER — MINERAL OIL
30 OIL (ML) ORAL AS NEEDED
Status: COMPLETED | OUTPATIENT
Start: 2022-02-14 | End: 2022-02-15

## 2022-02-14 RX ORDER — FENTANYL CITRATE 50 UG/ML
INJECTION, SOLUTION INTRAMUSCULAR; INTRAVENOUS
Status: COMPLETED
Start: 2022-02-14 | End: 2022-02-14

## 2022-02-14 RX ORDER — ONDANSETRON 2 MG/ML
4 INJECTION INTRAMUSCULAR; INTRAVENOUS
Status: DISCONTINUED | OUTPATIENT
Start: 2022-02-14 | End: 2022-02-15 | Stop reason: HOSPADM

## 2022-02-14 RX ORDER — METHYLERGONOVINE MALEATE 0.2 MG/ML
0.2 INJECTION INTRAVENOUS AS NEEDED
Status: COMPLETED | OUTPATIENT
Start: 2022-02-14 | End: 2022-02-15

## 2022-02-14 RX ORDER — NALOXONE HYDROCHLORIDE 0.4 MG/ML
0.2 INJECTION, SOLUTION INTRAMUSCULAR; INTRAVENOUS; SUBCUTANEOUS AS NEEDED
Status: DISCONTINUED | OUTPATIENT
Start: 2022-02-14 | End: 2022-02-15 | Stop reason: HOSPADM

## 2022-02-14 RX ORDER — LIDOCAINE HYDROCHLORIDE 10 MG/ML
20 INJECTION, SOLUTION EPIDURAL; INFILTRATION; INTRACAUDAL; PERINEURAL AS NEEDED
Status: DISCONTINUED | OUTPATIENT
Start: 2022-02-14 | End: 2022-02-15 | Stop reason: HOSPADM

## 2022-02-14 RX ORDER — LIDOCAINE HYDROCHLORIDE 10 MG/ML
INJECTION INFILTRATION; PERINEURAL AS NEEDED
Status: DISCONTINUED | OUTPATIENT
Start: 2022-02-14 | End: 2022-02-15 | Stop reason: HOSPADM

## 2022-02-14 RX ORDER — FENTANYL CITRATE 50 UG/ML
100 INJECTION, SOLUTION INTRAMUSCULAR; INTRAVENOUS ONCE
Status: COMPLETED | OUTPATIENT
Start: 2022-02-14 | End: 2022-02-14

## 2022-02-14 RX ORDER — OXYTOCIN/RINGER'S LACTATE 30/500 ML
10 PLASTIC BAG, INJECTION (ML) INTRAVENOUS AS NEEDED
Status: DISCONTINUED | OUTPATIENT
Start: 2022-02-14 | End: 2022-02-15 | Stop reason: HOSPADM

## 2022-02-14 RX ORDER — METOCLOPRAMIDE HYDROCHLORIDE 5 MG/ML
10 INJECTION INTRAMUSCULAR; INTRAVENOUS
Status: DISCONTINUED | OUTPATIENT
Start: 2022-02-14 | End: 2022-02-15 | Stop reason: HOSPADM

## 2022-02-14 RX ORDER — SODIUM CHLORIDE, SODIUM LACTATE, POTASSIUM CHLORIDE, CALCIUM CHLORIDE 600; 310; 30; 20 MG/100ML; MG/100ML; MG/100ML; MG/100ML
125 INJECTION, SOLUTION INTRAVENOUS CONTINUOUS
Status: DISCONTINUED | OUTPATIENT
Start: 2022-02-14 | End: 2022-02-15 | Stop reason: HOSPADM

## 2022-02-14 RX ORDER — OXYTOCIN/0.9 % SODIUM CHLORIDE 30/500 ML
87.3 PLASTIC BAG, INJECTION (ML) INTRAVENOUS AS NEEDED
Status: DISCONTINUED | OUTPATIENT
Start: 2022-02-14 | End: 2022-02-15 | Stop reason: HOSPADM

## 2022-02-14 RX ORDER — HYDROMORPHONE HYDROCHLORIDE 1 MG/ML
1 INJECTION, SOLUTION INTRAMUSCULAR; INTRAVENOUS; SUBCUTANEOUS
Status: DISCONTINUED | OUTPATIENT
Start: 2022-02-14 | End: 2022-02-15 | Stop reason: HOSPADM

## 2022-02-14 RX ADMIN — LIDOCAINE HYDROCHLORIDE,EPINEPHRINE BITARTRATE 4.5 ML: 15; .005 INJECTION, SOLUTION EPIDURAL; INFILTRATION; INTRACAUDAL; PERINEURAL at 15:41

## 2022-02-14 RX ADMIN — SODIUM CHLORIDE, POTASSIUM CHLORIDE, SODIUM LACTATE AND CALCIUM CHLORIDE 125 ML/HR: 600; 310; 30; 20 INJECTION, SOLUTION INTRAVENOUS at 21:11

## 2022-02-14 RX ADMIN — Medication 12 ML/HR: at 15:46

## 2022-02-14 RX ADMIN — BUTORPHANOL TARTRATE 2 MG: 2 INJECTION, SOLUTION INTRAMUSCULAR; INTRAVENOUS at 14:25

## 2022-02-14 RX ADMIN — SODIUM CHLORIDE, POTASSIUM CHLORIDE, SODIUM LACTATE AND CALCIUM CHLORIDE 1000 ML: 600; 310; 30; 20 INJECTION, SOLUTION INTRAVENOUS at 15:45

## 2022-02-14 RX ADMIN — Medication 12 ML/HR: at 19:19

## 2022-02-14 RX ADMIN — Medication 2 MILLI-UNITS/MIN: at 07:29

## 2022-02-14 RX ADMIN — SODIUM CHLORIDE, POTASSIUM CHLORIDE, SODIUM LACTATE AND CALCIUM CHLORIDE 125 ML/HR: 600; 310; 30; 20 INJECTION, SOLUTION INTRAVENOUS at 07:15

## 2022-02-14 RX ADMIN — ROPIVACAINE HYDROCHLORIDE 12 ML/HR: 5 INJECTION, SOLUTION EPIDURAL; INFILTRATION; PERINEURAL at 15:46

## 2022-02-14 RX ADMIN — Medication 12 ML/HR: at 21:37

## 2022-02-14 RX ADMIN — FENTANYL CITRATE 100 MCG: 50 INJECTION, SOLUTION INTRAMUSCULAR; INTRAVENOUS at 15:45

## 2022-02-14 RX ADMIN — LIDOCAINE HYDROCHLORIDE 1 ML: 10 INJECTION, SOLUTION INFILTRATION; PERINEURAL at 15:40

## 2022-02-14 NOTE — ANESTHESIA PROCEDURE NOTES
Epidural Block    Patient location during procedure: OB  Start time: 2/14/2022 3:40 PM  End time: 2/14/2022 3:41 PM  Reason for block: labor epidural  Staffing  Performed: CRNA   Anesthesiologist: Brenda Bowens DO  Resident/CRNA: Jeannette RIVAS CRNA  Preanesthetic Checklist  Completed: patient identified, IV checked, site marked, risks and benefits discussed, surgical consent, monitors and equipment checked, pre-op evaluation and timeout performed  Block Placement  Patient position: sitting  Prep: ChloraPrep  Sterility prep: mask, hand, gloves, drape and cap  Sedation level: no sedation  Patient monitoring: heart rate, frequent blood pressure checks and continuous pulse oximetry  Approach: midline  Location: lumbar  Lumbar location: L3-L4  Epidural  Loss of resistance technique: saline  Guidance: landmark technique  Needle  Needle type: Tuohy   Needle gauge: 17 G  Needle length: 9 cm  Needle insertion depth: 5 cm  Catheter type: end hole  Catheter size: 19 G  Catheter at skin depth: 10 cm  Catheter securement method: surgical tape, stabilization device and clear occlusive dressing  Test dose: negative  Assessment  Sensory level: T6  Block outcome: pain improved  Number of attempts: 1  Procedure assessment: patient tolerated procedure well with no immediate complications

## 2022-02-14 NOTE — PROGRESS NOTES
0725 Bedside and Verbal shift change report given to Zenobia Jeff RN (oncoming nurse) by Felix Avila RN  (offgoing nurse). Report included the following information SBAR, Kardex, Intake/Output, MAR, Recent Results and Med Rec Status. 0730 Dr Batool Jha in room. VE 2cm soft. 0940 Left lateral with peanut ball. 1320 Pt sitting on birthing ball. Contractions palpate moderately firm. 1430 Right lateral with peanut ball between legs. 200 Pt request for epidural. IVF bolusing. 1801 [facility] in room. Time out. 1541 Catheter placed. Test dose. 1545 Bolus given. 1548 PCEA pump setup and double verified. [facility] 81. Dr Batool Jha in room. AROM clear fluid. 1830 Unable to void with bedpan. Orders for stone catheter obtained and placed. Turned to far left with peanut ball. 380 [facility], new chux placed. Turned to far right. No urge to push, pt has nausea. 1920 Bedside and Verbal shift change report given to CHENTE Celeste RN  (oncoming nurse) by Felix Avila RN  (offgoing nurse). Report included the following information SBAR, Kardex, Procedure Summary, Intake/Output, MAR, Recent Results and Med Rec Status.

## 2022-02-14 NOTE — H&P
History & Physical    Name: Olivia Barclay MRN: [id number]  SSN: [id number]    YOB: 1985  Age: 40 y.o. Sex: female        Subjective:     Estimated Date of Delivery: 22  OB History    Para Term  AB Living   2 1 1     1   SAB IAB Ectopic Molar Multiple Live Births           0 1      # Outcome Date GA Lbr Jose/2nd Weight Sex Delivery Anes PTL Lv   2 Current            1 Term 20 40w3d / 01:11 3.89 kg F Farhad Torrez Im is admitted with pregnancy at 39w2d for induction of labor. Prenatal course was complicated by advanced maternal age, mild thombocytopenia, and LGA (ultrasound on  showed EFW >99%, 4026g). Please see prenatal records for details. History reviewed. No pertinent past medical history. Past Surgical History:   Procedure Laterality Date    HX WISDOM TEETH EXTRACTION       Social History     Occupational History    Not on file   Tobacco Use    Smoking status: Never Smoker    Smokeless tobacco: Never Used   Vaping Use    Vaping Use: Never used   Substance and Sexual Activity    Alcohol use: Never    Drug use: Never    Sexual activity: Yes     Partners: Male     Birth control/protection: None     Family History   Problem Relation Age of Onset    Cancer Other     Colon Cancer Maternal Grandmother         in her 80's    Breast Cancer Paternal Grandmother         x2    Diabetes Paternal Grandfather     Other Paternal Aunt         spinal bifida -  after birth       No Known Allergies  Prior to Admission medications    Medication Sig Start Date End Date Taking? Authorizing Provider   PNV66-Iron Fumarate-FA-DSS-DHA 26-1.2- mg cap Take  by mouth. Yes Provider, Historical        Review of Systems: A comprehensive review of systems was negative except for that written in the HPI.     Objective:     Vitals:  Vitals:    22 0541   BP: 133/80   Pulse: 89   Resp: 16   Temp: 98.3 °F (36.8 °C)   Weight: 88.9 kg (196 lb)   Height: 5' 5\" (1.651 m)        Physical Exam:  Patient without distress. Abdomen: soft, nontender  Fundus: soft and non tender  Perineum: blood absent, amniotic fluid absent  Cervical Exam: 2/50/-3, soft  Membranes:  Intact  Fetal Heart Rate: 120/moderate/+accels/-decels, uterine irritability    Prenatal Labs:   Lab Results   Component Value Date/Time    ABO/Rh(D) PENDING 2022 05:39 AM    Rubella, External immune 2019 12:00 AM    GrBStrep, External negative 2020 12:00 AM    HBsAg, External negative 2019 12:00 AM    ABO,Rh B+ 2019 12:00 AM         Assessment/Plan:     Principal Problem:    Excessive fetal growth affecting management of pregnancy in third trimester (2022)    Active Problems:    AMA (advanced maternal age) multigravida 35+, first trimester (2021)      Overview: EMVS offered; pt thinking      Pregnancy with 39 completed weeks gestation (2022)         Plan: Admit for  IOL, pitocin already ordered. Group B Strep was negative. Do anticipate , however discussed that due to expected fetal size, will watch labor course closely. Discussed risks of shoulder dystocia.      Signed By:  Grisel Parks MD     2022

## 2022-02-14 NOTE — ANESTHESIA PREPROCEDURE EVALUATION
Relevant Problems   No relevant active problems       Anesthetic History   No history of anesthetic complications            Review of Systems / Medical History  Patient summary reviewed, nursing notes reviewed and pertinent labs reviewed    Pulmonary  Within defined limits                 Neuro/Psych   Within defined limits           Cardiovascular  Within defined limits                Exercise tolerance: >4 METS     GI/Hepatic/Renal  Within defined limits              Endo/Other  Within defined limits           Other Findings              Physical Exam    Airway  Mallampati: II  TM Distance: 4 - 6 cm  Neck ROM: normal range of motion   Mouth opening: Normal     Cardiovascular  Regular rate and rhythm,  S1 and S2 normal,  no murmur, click, rub, or gallop             Dental  No notable dental hx       Pulmonary                 Abdominal  GI exam deferred       Other Findings            Anesthetic Plan    ASA: 1  Anesthesia type: epidural            Anesthetic plan and risks discussed with: Patient and Father      Risks reviewed with pt and father, agree to proceed.

## 2022-02-14 NOTE — PROGRESS NOTES
Labor Progress Note  Patient seen, fetal heart rate and contraction pattern evaluated, patient examined. Comfortable with epidural  Patient Vitals for the past 1 hrs:   BP Temp Pulse SpO2   02/14/22 1646  98 °F (36.7 °C)     02/14/22 1640    99 %   02/14/22 1635    99 %   02/14/22 1631 (!) 97/52  70    02/14/22 1630    96 %   02/14/22 1625    100 %   02/14/22 1620    100 %   02/14/22 1616 (!) 109/55  92    02/14/22 1615    100 %   02/14/22 1610    100 %   02/14/22 1605    100 %   02/14/22 1601 (!) 109/49  91    02/14/22 1600    99 %   02/14/22 1558 (!) 110/50  79    02/14/22 1555 130/64  96 100 %   02/14/22 1552 125/61  75    02/14/22 1550    100 %   02/14/22 1549 114/69  76        Physical Exam:  Cervical Exam:  4-5/80/-2. AROM performed for clear fluid. Membranes:  Artificial Rupture of Membranes; Amniotic Fluid: small amount of clear fluid  Uterine Activity: q2-4mins  Fetal Heart Rate: 120/moderate/+accels/-decels    Assessment/Plan:  AROM performed at 1062 without complication.   Continue plan for vaginal delivery

## 2022-02-14 NOTE — ROUTINE PROCESS
Pt is a 40 y.o.  at 39w2d, arrived to L&D for induction. EFM and TOCO applied, call bell within reach. [facility] [address] [place] [address] for induction orders. Callback received with admission orders submitted. 07 Bedside and Verbal shift change report given to JULIANNE Mendoza (oncoming nurse) by ARIK Marcial and REINA Bryant (offgoing nurse). Report included the following information SBAR, Kardex, MAR and Recent Results.

## 2022-02-15 PROBLEM — O09.521 AMA (ADVANCED MATERNAL AGE) MULTIGRAVIDA 35+, FIRST TRIMESTER: Status: RESOLVED | Noted: 2021-07-16 | Resolved: 2022-02-15

## 2022-02-15 PROBLEM — Z3A.39 PREGNANCY WITH 39 COMPLETED WEEKS GESTATION: Status: RESOLVED | Noted: 2022-02-14 | Resolved: 2022-02-15

## 2022-02-15 PROCEDURE — 74011000250 HC RX REV CODE- 250: Performed by: MIDWIFE

## 2022-02-15 PROCEDURE — 75410000002 HC LABOR FEE PER 1 HR

## 2022-02-15 PROCEDURE — 75410000000 HC DELIVERY VAGINAL/SINGLE

## 2022-02-15 PROCEDURE — 00HU33Z INSERTION OF INFUSION DEVICE INTO SPINAL CANAL, PERCUTANEOUS APPROACH: ICD-10-PCS | Performed by: ANESTHESIOLOGY

## 2022-02-15 PROCEDURE — 74011250636 HC RX REV CODE- 250/636: Performed by: OBSTETRICS & GYNECOLOGY

## 2022-02-15 PROCEDURE — 74011000258 HC RX REV CODE- 258: Performed by: MIDWIFE

## 2022-02-15 PROCEDURE — 74011000258 HC RX REV CODE- 258: Performed by: OBSTETRICS & GYNECOLOGY

## 2022-02-15 PROCEDURE — 0KQM0ZZ REPAIR PERINEUM MUSCLE, OPEN APPROACH: ICD-10-PCS | Performed by: MIDWIFE

## 2022-02-15 PROCEDURE — 75410000003 HC RECOV DEL/VAG/CSECN EA 0.5 HR

## 2022-02-15 PROCEDURE — 65270000029 HC RM PRIVATE

## 2022-02-15 PROCEDURE — 74011250637 HC RX REV CODE- 250/637: Performed by: MIDWIFE

## 2022-02-15 PROCEDURE — 76060000078 HC EPIDURAL ANESTHESIA

## 2022-02-15 PROCEDURE — 74011250636 HC RX REV CODE- 250/636: Performed by: MIDWIFE

## 2022-02-15 PROCEDURE — 74011250637 HC RX REV CODE- 250/637

## 2022-02-15 PROCEDURE — 88307 TISSUE EXAM BY PATHOLOGIST: CPT

## 2022-02-15 RX ORDER — METHYLERGONOVINE MALEATE 0.2 MG/ML
0.2 INJECTION INTRAVENOUS ONCE
Status: ACTIVE | OUTPATIENT
Start: 2022-02-15 | End: 2022-02-15

## 2022-02-15 RX ORDER — AMPICILLIN 2 G/1
2 INJECTION, POWDER, FOR SOLUTION INTRAVENOUS EVERY 6 HOURS
Status: DISCONTINUED | OUTPATIENT
Start: 2022-02-15 | End: 2022-02-15

## 2022-02-15 RX ORDER — PROMETHAZINE HYDROCHLORIDE 25 MG/ML
25 INJECTION, SOLUTION INTRAMUSCULAR; INTRAVENOUS
Status: DISCONTINUED | OUTPATIENT
Start: 2022-02-15 | End: 2022-02-17 | Stop reason: HOSPADM

## 2022-02-15 RX ORDER — CEFAZOLIN SODIUM/WATER 2 G/20 ML
2 SYRINGE (ML) INTRAVENOUS ONCE
Status: COMPLETED | OUTPATIENT
Start: 2022-02-15 | End: 2022-02-15

## 2022-02-15 RX ORDER — MISOPROSTOL 200 UG/1
TABLET ORAL
Status: COMPLETED
Start: 2022-02-15 | End: 2022-02-15

## 2022-02-15 RX ORDER — AMOXICILLIN 250 MG
1 CAPSULE ORAL
Status: DISCONTINUED | OUTPATIENT
Start: 2022-02-15 | End: 2022-02-17 | Stop reason: HOSPADM

## 2022-02-15 RX ORDER — ACETAMINOPHEN 325 MG/1
650 TABLET ORAL
Status: DISCONTINUED | OUTPATIENT
Start: 2022-02-15 | End: 2022-02-17 | Stop reason: HOSPADM

## 2022-02-15 RX ORDER — MINERAL OIL
OIL (ML) ORAL
Status: COMPLETED
Start: 2022-02-15 | End: 2022-02-15

## 2022-02-15 RX ORDER — MISOPROSTOL 200 UG/1
1000 TABLET ORAL ONCE
Status: DISCONTINUED | OUTPATIENT
Start: 2022-02-15 | End: 2022-02-15 | Stop reason: SDUPTHER

## 2022-02-15 RX ORDER — MISOPROSTOL 200 UG/1
1000 TABLET ORAL ONCE
Status: ACTIVE | OUTPATIENT
Start: 2022-02-15 | End: 2022-02-15

## 2022-02-15 RX ORDER — ACETAMINOPHEN 325 MG/1
975 TABLET ORAL
Status: DISCONTINUED | OUTPATIENT
Start: 2022-02-15 | End: 2022-02-17 | Stop reason: HOSPADM

## 2022-02-15 RX ORDER — IBUPROFEN 400 MG/1
800 TABLET ORAL
Status: DISCONTINUED | OUTPATIENT
Start: 2022-02-15 | End: 2022-02-17 | Stop reason: HOSPADM

## 2022-02-15 RX ORDER — LANOLIN ALCOHOL/MO/W.PET/CERES
3 CREAM (GRAM) TOPICAL
Status: DISCONTINUED | OUTPATIENT
Start: 2022-02-15 | End: 2022-02-17 | Stop reason: HOSPADM

## 2022-02-15 RX ORDER — OXYCODONE AND ACETAMINOPHEN 5; 325 MG/1; MG/1
2 TABLET ORAL
Status: DISCONTINUED | OUTPATIENT
Start: 2022-02-15 | End: 2022-02-17 | Stop reason: HOSPADM

## 2022-02-15 RX ADMIN — Medication 30 ML: at 04:15

## 2022-02-15 RX ADMIN — MISOPROSTOL 1000 MCG: 200 TABLET ORAL at 04:45

## 2022-02-15 RX ADMIN — METHYLERGONOVINE MALEATE 0.2 MG: 0.2 INJECTION, SOLUTION INTRAMUSCULAR; INTRAVENOUS at 04:39

## 2022-02-15 RX ADMIN — ACETAMINOPHEN 975 MG: 325 TABLET ORAL at 01:39

## 2022-02-15 RX ADMIN — CEFAZOLIN 2 G: 10 INJECTION, POWDER, FOR SOLUTION INTRAVENOUS at 06:26

## 2022-02-15 RX ADMIN — MINERAL OIL 30 ML: 1000 SOLUTION ORAL at 04:15

## 2022-02-15 RX ADMIN — AMPICILLIN SODIUM 2 G: 2 INJECTION, POWDER, FOR SOLUTION INTRAMUSCULAR; INTRAVENOUS at 01:39

## 2022-02-15 RX ADMIN — IBUPROFEN 800 MG: 400 TABLET, FILM COATED ORAL at 08:26

## 2022-02-15 RX ADMIN — GENTAMICIN SULFATE 285.2 MG: 40 INJECTION, SOLUTION INTRAMUSCULAR; INTRAVENOUS at 02:44

## 2022-02-15 RX ADMIN — IBUPROFEN 800 MG: 400 TABLET, FILM COATED ORAL at 19:34

## 2022-02-15 NOTE — PROGRESS NOTES
6601 Verbal shift change report given to Manny Conley RN (oncoming nurse) by Carey Munoz RN (offgoing nurse). Report included the following information SBAR, Kardex, Procedure Summary, Intake/Output, Accordion and Recent Results. 1711 Patient ambulated to bathroom with steady gait. Patient voided 500mL. 0715  TRANSFER - OUT REPORT:    Verbal report given to Kahlil Chan (name) on [place] [address]  being transferred to [facility] (unit) for routine progression of care       Report consisted of patients Situation, Background, Assessment and   Recommendations(SBAR). Information from the following report(s) SBAR, Kardex, Procedure Summary, Intake/Output, MAR and Recent Results was reviewed with the receiving nurse. Opportunity for questions and clarification was provided.       Patient transported with:   Registered Nurse

## 2022-02-15 NOTE — PROGRESS NOTES
Problem: Vaginal Delivery: Day of Delivery-Post delivery  Goal: Activity/Safety  Outcome: Progressing Towards Goal  Goal: Consults, if ordered  Outcome: Progressing Towards Goal  Goal: Nutrition/Diet  Outcome: Progressing Towards Goal  Goal: Discharge Planning  Outcome: Progressing Towards Goal  Goal: Medications  Outcome: Progressing Towards Goal  Goal: Treatments/Interventions/Procedures  Outcome: Progressing Towards Goal  Goal: *Vital signs within defined limits  Outcome: Progressing Towards Goal  Goal: *Labs within defined limits  Outcome: Progressing Towards Goal  Goal: *Hemodynamically stable  Outcome: Progressing Towards Goal  Goal: *Optimal pain control at patient's stated goal  Outcome: Progressing Towards Goal  Goal: *Participates in infant care  Outcome: Progressing Towards Goal  Goal: *Demonstrates progressive activity  Outcome: Progressing Towards Goal  Goal: *Tolerating diet  Outcome: Progressing Towards Goal     Problem: Pain  Goal: *Control of Pain  Outcome: Progressing Towards Goal     Problem: Falls - Risk of  Goal: *Absence of Falls  Description: Document Edilberto Fall Risk and appropriate interventions in the flowsheet.   Outcome: Progressing Towards Goal  Note: Fall Risk Interventions:                 Elimination Interventions: Call light in reach,Patient to call for help with toileting needs

## 2022-02-15 NOTE — PROGRESS NOTES
OB Labor Note    Assessment:    at 39+2, elective IOL for suspected macrosomia, IUP now in active labor - s/p Pitocin, AROMc, pitocin off for fetal resuscitation              Cat II fetal tracing - overall reassuring              Low risk PPH              Comfortable with epidural     Plan:   Fetal resuscitative measures as indicated - placed IUPC for amnioinfusion for recurrent variable decelerations   Restart pitocin with reassuring fetal status              Continue to monitor labor              Anticipate                    Subjective:              Pt. does have complaints of intermittent pressure. Pt. is feeling contractions/pressure. Pt. is feeling fetal movement.       Objective:     V/E: /, FSE, IUPC placed     Cat II fetal tracing - baseline rate 140, accelerations,  recurrent variable decelerations, moderate variability     Ctx: every 2-4 min. Visit Vitals  /68   Pulse 94   Temp 98.1 °F (36.7 °C)   Resp 18   Ht 5' 5\" (1.651 m)   Wt 88.9 kg (196 lb)   SpO2 100%   Breastfeeding Yes   BMI 32.62 kg/m²      Cervical Exam  Dilation (cm): 8  Eff: 80 %  Station: -1  Cervical Consistency: Soft  Vaginal exam done by? : NAVEEN Saenz CNM  Membrane Status: AROM     Patient Vitals for the past 4 hrs:    Mode Fetal Heart Rate Variability Decelerations Accelerations RN Reviewed Strip?   22 1930 External        22 1915  Rella Maxin   Yes   22 1900 External 120 6-25 BPM Variable Yes    22 1830 External 120 6-25 BPM Early;Variable Yes           2022 10:22 PM

## 2022-02-15 NOTE — PROGRESS NOTES
OB Labor Note    Assessment:    at 39+3, elective IOL for suspected macrosomia, IUP now in active labor - s/p Pitocin, AROMc, pitocin off for fetal resuscitation - restarted with fetal well being   Suspected triple I - fetal and maternal tachycardia, febrile at 100. 1              Cat II fetal tracing - overall reassuring   Moderate risk PPH              Comfortable with epidural     Plan:  Ampicillin/gentamycin for suspected Triple I, tylenol for fever     Fetal resuscitative measures as indicated -  IUPC for amnioinfusion for recurrent variable decelerations              titrate pitocin for adequate MVUs              Continue to monitor labor              Anticipate                    Subjective:              Pt. does have complaints of intermittent pressure.  Pt. is feeling contractions/pressure.  Pt. is feeling fetal movement.       Objective:     V/E: , FSE, IUPC in place     Cat II fetal tracing - fetal tachycardia with baseline rate 190, accelerations,  intermittent late and early decelerations, moderate variability     Ctx: every 2-3 min. Visit Vitals  /61   Pulse 87   Temp 100.1 °F (37.8 °C)   Resp 18   Ht 5' 5\" (1.651 m)   Wt 88.9 kg (196 lb)   SpO2 99%   Breastfeeding Yes   BMI 32.62 kg/m²      Cervical Exam  Dilation (cm): 9  Eff: 80 %  Station: -1  Cervical Consistency: Soft  Vaginal exam done by? : NAVEEN Tom CNM  Membrane Status: AROM     Patient Vitals for the past 4 hrs: Mode Fetal Heart Rate Fetal Activity Variability Decelerations Accelerations RN Reviewed Strip? Provider who reviewed strip?  FHR Interventions   02/15/22 0045       Yes     02/15/22 0030 External 150  6-25 BPM Early Yes      02/15/22 0015       Yes     02/15/22 0000 External 145  6-25 BPM Variable Yes      22 2345       Yes     22 2330 External 145  6-25 BPM Variable Yes      22 2315       Yes     22 2245       Yes     22 2230 External 130 Present 6-25 BPM Variable Yes Yes     02/14/22 2215       Yes     02/14/22 2200 External;Internal 140  6-25 BPM Early;Variable;Prolonged No Yes     02/14/22 2152         FSE Applied   02/14/22 2145       Yes     02/14/22 2130 External 130  6-25 BPM (!) Variable;Late No Yes NAVEEN Alvares          2/15/2022 1:19 AM

## 2022-02-15 NOTE — PROGRESS NOTES
Problem: Patient Education: Go to Patient Education Activity  Goal: Patient/Family Education  2/14/2022 2250 by Maria R Simons  Outcome: Progressing Towards Goal  2/14/2022 2127 by Maria R Simons  Outcome: Progressing Towards Goal     Problem: Vaginal Delivery: Day of Deliver-Laboring  Goal: Off Pathway (Use only if patient is Off Pathway)  2/14/2022 2250 by Maria R Simons  Outcome: Progressing Towards Goal  2/14/2022 2127 by Maria R Simons  Outcome: Progressing Towards Goal  Goal: Activity/Safety  2/14/2022 2250 by Maria R Simons  Outcome: Progressing Towards Goal  2/14/2022 2127 by Maria R Simons  Outcome: Progressing Towards Goal  Goal: Consults, if ordered  2/14/2022 2250 by Maria R Simons  Outcome: Progressing Towards Goal  2/14/2022 2127 by Maria R Simons  Outcome: Progressing Towards Goal  Goal: Diagnostic Test/Procedures  2/14/2022 2250 by Maria R Simons  Outcome: Progressing Towards Goal  2/14/2022 2127 by Maria R Simons  Outcome: Progressing Towards Goal  Goal: Nutrition/Diet  2/14/2022 2250 by Maria R Simons  Outcome: Progressing Towards Goal  2/14/2022 2127 by Maria R Simons  Outcome: Progressing Towards Goal  Goal: Discharge Planning  2/14/2022 2250 by Maria R Simons  Outcome: Progressing Towards Goal  2/14/2022 2127 by Maria R iSmons  Outcome: Progressing Towards Goal  Goal: Medications  2/14/2022 2250 by Maria R Simons  Outcome: Progressing Towards Goal  2/14/2022 2127 by Maria R Simons  Outcome: Progressing Towards Goal  Goal: Respiratory  2/14/2022 2250 by Maria R Simons  Outcome: Progressing Towards Goal  2/14/2022 2127 by Maria R Simons  Outcome: Progressing Towards Goal  Goal: Treatments/Interventions/Procedures  2/14/2022 2250 by Maria R Simons  Outcome: Progressing Towards Goal  2/14/2022 2127 by Maria R Simons  Outcome: Progressing Towards Goal  Goal: *Vital signs within defined limits  2/14/2022 2250 by Home Horta  Outcome: Progressing Towards Goal  2/14/2022 2127 by Home Horta  Outcome: Progressing Towards Goal  Goal: *Labs within defined limits  2/14/2022 2250 by Keyanna JAQUEZ  Outcome: Progressing Towards Goal  2/14/2022 2127 by Home Horta  Outcome: Progressing Towards Goal  Goal: *Hemodynamically stable  2/14/2022 2250 by Keyanna Wallace B  Outcome: Progressing Towards Goal  2/14/2022 2127 by Keyanna JAQUEZ  Outcome: Progressing Towards Goal  Goal: *Optimal pain control at patient's stated goal  2/14/2022 2250 by Home Horta  Outcome: Progressing Towards Goal  2/14/2022 2127 by Home Horta  Outcome: Progressing Towards Goal     Problem: Pain  Goal: *Control of Pain  2/14/2022 2250 by Home Horta  Outcome: Progressing Towards Goal  2/14/2022 2127 by Home Horta  Outcome: Progressing Towards Goal     Problem: Patient Education: Go to Patient Education Activity  Goal: Patient/Family Education  2/14/2022 2250 by Home Horta  Outcome: Progressing Towards Goal  2/14/2022 2127 by Home Horta  Outcome: Progressing Towards Goal     Problem: Falls - Risk of  Goal: *Absence of Falls  Description: Document Renetta Ground Fall Risk and appropriate interventions in the flowsheet.   Outcome: Progressing Towards Goal  Note: Fall Risk Interventions:                                Problem: Patient Education: Go to Patient Education Activity  Goal: Patient/Family Education  Outcome: Progressing Towards Goal

## 2022-02-15 NOTE — PROGRESS NOTES
DELIVERY SUMMARY    Patient:  Lashawn Kaur    Delivery Type: Vaginal, Spontaneous   Delivery Date: 2022  Delivery Time:  [address]  AM      Birth Weight:   26g     Sex:  male  Circumcision: desires  Delivery Clinician:  Romayne Denis   Gestational Age: 43+1 wga     Presentation: Vertex   Position: OA to Right Occiput  Anterior      Apgars were 8  and 8       Resuscitation Method: Tactile Stimulation;Suctioning-bulb     Meconium Stained: None    Living Status: Living        Placenta Date/Time: 1772   AM   Placenta Removal: Spontaneous   Placenta Appearance: Normal - sent to pathology for suspected chorioamnionitis     Cord Information: 3 Vessels          Disposition of Cord Blood: discarded after DNA obtained  Blood Gases Sent?:  No         Cord pH:  none     Episiotomy: none  Laceration(s):    second degree ML repaired  Estimated Blood Loss (ml): 450     Labor Events  Method:     induction  Augmentation:  n/a  Cervical Ripening:   n/a     Induction - yes     Induction Indication - suspected macrosomia     Induction Method - Pitocin, AROM     Complications - macrosomia, shoulder dystocia, suspected chorioamnionitis     NICU Admit - no     HTN Disorders - none     Diabetes - N/A     Operative Vaginal Delivery - none     Additional Delivery Comments -  viable male infant, OA to MANFRED, shoulder dystocia  Noted - maternal repositioning to supine, McRobert's by RN staff, supra pubic pressure released the SD with total time 30 sec., infant to mother in stable condition for apgars of 8/8 , delayed cord clamping for 1 minute, cord clamped and cut by FOB and infant to awaiting peds for evaluation. cord blood for DNA obtained, pitocin infusing for active management of third stage, placenta delivered appearing intact, ardon, spont.  With 3 vc - sent to pathology for suspected chorioamnionitis, fundus boggy - bimanual compressions while methergine ordered/given IM, uterine sweep with no products of conception noted/ clots removed - fundus then firm, cytotec 1000 mcg placed DC. Ancef 2 gm.  Ordered for endometritis prophylaxis,  perineum inspected - second degree ML laceration noted and repaired with 3-0 vicryl for hemostasis,  ml,  Complications: macrosomia, shoulder dystocia relieved with McRobert's and supra pubic pressure maneuver, suspected chorioamnionitis  ;  mother and infant in stable condition

## 2022-02-15 NOTE — PROGRESS NOTES
3430 - Report received from Venus Bosworth, RN. Care assumed at this time. 6230 - Patient complete. 36 - Patient pushing. This RN remained at bedside continuously assessing FHR and contraction pattern. 0 -  of viable male infant.     0605 - Bedside and Verbal shift change report given to ALICIA Segura by Kendal Cooley RN. Report included the following information SBAR, Intake/Output and MAR.

## 2022-02-15 NOTE — ANESTHESIA POSTPROCEDURE EVALUATION
2/15/2022  8:57 AM    Laboring Epidural Follow-up Note     Referring physician: Leelee Rice, *   Patient status post vaginal delivery with labor epidural    Visit Vitals  /77   Pulse 69   Temp 37.5 °C (99.5 °F)   Resp 18   Ht 5' 5\" (1.651 m)   Wt 88.9 kg (196 lb)   LMP 05/15/2021 (Approximate)   SpO2 100%   Breastfeeding Yes   BMI 32.62 kg/m²       Epidural removed by L&D staff  No sedation, pruritis noted. Adequate analgesia.   No obvious anesthesia complications          Elisha Ambrose, CRNA

## 2022-02-15 NOTE — PROGRESS NOTES
Bedside shift change report given to KIRK Keane RN (oncoming nurse) by MARLI Bennett (offgoing nurse). Report included the following information SBAR, Kardex, Intake/Output, MAR and Recent Results.

## 2022-02-15 NOTE — PROGRESS NOTES
OB Labor Note    Assessment:    at 39+2, elective IOL for suspected macrosomia, IUP now in active labor - s/p Pitocin, AROMc   Cat II fetal tracing - overall reassuring   Low risk PPH   Comfortable with epidural    Plan: Fetal resuscitative measures as indicated - consider IUPC for amnioinfusion if recurrent variable decelerations   Continue to monitor labor   Anticipate        Subjective:   Pt. does have complaints of intermittent pressure. Pt. is feeling contractions/pressure. Pt. is feeling fetal movement. Objective:    V/E: /    Cat II fetal tracing - baseline rate 130, accelerations,  intermittent early and variable decelerations, moderate variability    Ctx: every 2-4 min. Visit Vitals  /85   Pulse 93   Temp 98.1 °F (36.7 °C)   Resp 18   Ht 5' 5\" (1.651 m)   Wt 88.9 kg (196 lb)   SpO2 100%   Breastfeeding Yes   BMI 32.62 kg/m²      Cervical Exam  Dilation (cm): 7  Eff: 80 %  Station: -1  Cervical Consistency: Soft  Vaginal exam done by? : Reji  Membrane Status: AROM     Patient Vitals for the past 4 hrs:    Mode Fetal Heart Rate Variability Decelerations Accelerations RN Reviewed Strip?   22 1930 External        22 1915  Clent Jose Raul   Yes   22 1900 External 120 6-25 BPM Variable Yes    22 1830 External 120 6-25 BPM Early;Variable Yes    22 1815 External 125 (!) Less than or equal to 5 BPM Early No    22 1800 External 120 (!) Less than or equal to 5 BPM Early No    22 1730 External 130 6-25 BPM Early No           2022 9:17 PM

## 2022-02-15 NOTE — PROGRESS NOTES
Post-Partum Day Number 0 Progress Note    Melva Stiles     Assessment:   Hospital Problems  Date Reviewed: 2/15/2022          Codes Class Noted POA    Spontaneous vaginal delivery ICD-10-CM: O80  ICD-9-CM: 902  2/15/2022 No        Shoulder dystocia during labor and delivery ICD-10-CM: O66.0  ICD-9-CM: 660.40  2/15/2022 No        Perineal laceration with delivery, second degree ICD-10-CM: O70.1  ICD-9-CM: 664.11  2/15/2022 No        * (Principal) Excessive fetal growth affecting management of pregnancy in third trimester ICD-10-CM: O36.63X0  ICD-9-CM: 656.63  2022 Yes            Doing well, post partum day 0    Plan:  1. Continue routine postpartum and perineal care as well as maternal education. 2. The risks and benefits of the circumcision  procedure and anesthesia including: bleeding, infection, variability of cosmetic results were discussed at length with the mother. She is aware that future repeat procedures may be necessary. She gives informed consent to proceed as noted and her questions are answered. 3. Suspected IAI - no fevers >100.4F, has received amp/gent x1. Also received ancef. DC abx at this time. 4. SD during delivery - Per RN report, baby doing well, initially needed O2 support but now in room with mom. Moving both arms spontaneously. Information for the patient's : Sam Echevarria [[id number]]   Vaginal, Spontaneous    Patient doing well without significant complaint. Voiding without difficulty, normal lochia.     Current Facility-Administered Medications   Medication Dose Route Frequency    ampicillin (OMNIPEN) 2 g in 0.9% sodium chloride (MBP/ADV) 100 mL MBP  2 g IntraVENous Q6H    methylergonovine (METHERGINE) 0.2 mg/mL (1 mL) injection 0.2 mg  0.2 mg IntraMUSCular ONCE    miSOPROStoL (CYTOTEC) tablet 1,000 mcg  1,000 mcg Rectal ONCE    fentaNYL- ropivacaine in NS(PF) 2-0.1 mcg/mL-% epidural infusion           Vitals:  Visit Vitals  /77   Pulse 69   Temp 99.5 °F (37.5 °C)   Resp 18   Ht 5' 5\" (1.651 m)   Wt 88.9 kg (196 lb)   LMP 05/15/2021 (Approximate)   SpO2 100%   Breastfeeding Yes   BMI 32.62 kg/m²     Temp (24hrs), Av.8 °F (37.1 °C), Min:98 °F (36.7 °C), Max:100.1 °F (37.8 °C)        Exam:   Patient without distress. Abdomen soft, fundus firm, nontender                Perineum with normal lochia noted. Lower extremities are negative for swelling, cords or tenderness. Labs:     Lab Results   Component Value Date/Time    WBC 8.9 2022 05:39 AM    WBC 7.8 2021 11:24 AM    WBC 13.2 2020 06:40 AM    HGB 13.3 2022 05:39 AM    HGB 13.6 2021 11:24 AM    HGB 13.9 2020 02:00 AM    HCT 39.3 2022 05:39 AM    HCT 39.2 2021 11:24 AM    HCT 40.5 2020 02:00 AM    PLATELET 855 (L)  05:39 AM    PLATELET 238 43/10/6347 11:24 AM    PLATELET 183 (L)  06:40 AM       No results found for this or any previous visit (from the past 24 hour(s)).

## 2022-02-15 NOTE — LACTATION NOTE
This note was copied from a baby's chart. 46 Mom educated on breastfeeding basics--hunger cues, feeding on demand, waking baby if baby sleeps too long between feeds, importance of skin to skin, positioning and latching, risk of pacifier use and supplemental feedings, and importance of rooming in--and use of log sheet. Mom also educated on benefits of breastfeeding for herself and baby. Mom verbalized understanding. No questions at this time. 0 infant latched and nursing well in the CC position. Mom was attempting to get  latched using the cradled position. Switched to the cross cradled and infant latched deeply. Encouraged mom to continue supporting breast during the feed. DOL behaviors were discussed. Mom verbalized understanding. Will remain available.

## 2022-02-15 NOTE — PROGRESS NOTES
0720 Bedside/Verbal shift change report given to Damion Denny RN (oncoming nurse) by Hannah Foster RN (offgoing nurse). Report included the following information SBAR, Kardex, Intake/Output, MAR and Recent Results. Pt educated on pain management    4987 Assessment completed, Pt C/O 3/10 abd pain, PRN Tylenol administered will follow up on effectiveness. +1 pitting edema to BLE, encouraged elevation with pillows. 0951 Pain reassessed, pt states pain has subsided, denies any needs at this time. 1121 Blood glucose assessed, currently at 50.    1452 Blood glucose assessed currently at 52.    1750 Reassessment completed, pt denies any pain or needs at this time, remains to have +1 pitting edema in BLE, reencouraged elevation with pillows    1920 Bedside and Verbal shift change report given to ALICIA Foster RN (oncoming nurse) by Damion Denny RN (offgoing nurse). Report included the following information SBAR, Kardex, Intake/Output, MAR and Recent Results.

## 2022-02-16 LAB
BASOPHILS # BLD: 0 K/UL (ref 0–0.1)
BASOPHILS NFR BLD: 0 % (ref 0–2)
DIFFERENTIAL METHOD BLD: ABNORMAL
EOSINOPHIL # BLD: 0.2 K/UL (ref 0–0.4)
EOSINOPHIL NFR BLD: 1 % (ref 0–5)
ERYTHROCYTE [DISTWIDTH] IN BLOOD BY AUTOMATED COUNT: 14 % (ref 11.6–14.5)
HCT VFR BLD AUTO: 34.6 % (ref 35–45)
HGB BLD-MCNC: 11.7 G/DL (ref 12–16)
IMM GRANULOCYTES # BLD AUTO: 0.1 K/UL (ref 0–0.04)
IMM GRANULOCYTES NFR BLD AUTO: 1 % (ref 0–0.5)
LYMPHOCYTES # BLD: 3.2 K/UL (ref 0.9–3.6)
LYMPHOCYTES NFR BLD: 25 % (ref 21–52)
MCH RBC QN AUTO: 31.7 PG (ref 24–34)
MCHC RBC AUTO-ENTMCNC: 33.8 G/DL (ref 31–37)
MCV RBC AUTO: 93.8 FL (ref 78–100)
MONOCYTES # BLD: 0.9 K/UL (ref 0.05–1.2)
MONOCYTES NFR BLD: 7 % (ref 3–10)
NEUTS SEG # BLD: 8.2 K/UL (ref 1.8–8)
NEUTS SEG NFR BLD: 65 % (ref 40–73)
NRBC # BLD: 0 K/UL (ref 0–0.01)
NRBC BLD-RTO: 0 PER 100 WBC
PLATELET # BLD AUTO: 130 K/UL (ref 135–420)
PMV BLD AUTO: 11.7 FL (ref 9.2–11.8)
RBC # BLD AUTO: 3.69 M/UL (ref 4.2–5.3)
WBC # BLD AUTO: 12.5 K/UL (ref 4.6–13.2)

## 2022-02-16 PROCEDURE — 65270000029 HC RM PRIVATE

## 2022-02-16 PROCEDURE — 85025 COMPLETE CBC W/AUTO DIFF WBC: CPT

## 2022-02-16 PROCEDURE — 36415 COLL VENOUS BLD VENIPUNCTURE: CPT

## 2022-02-16 NOTE — PROGRESS NOTES
1915 Verbal shift change report given to OLUIS Ross RN (oncoming nurse) by Sae Shaffer RN (offgoing nurse). Report included the following information SBAR, Kardex, Procedure Summary, Intake/Output, MAR and Recent Results. 1935 Assessment completed. 2000 Patient in room with birth registrar. Tape used on IV.    2305 Patient breastfeeding infant. Patient asked and given extra ice packs, heat packs, and mesh underwear. 0015 Patient called saying that she is done breastfeeding. 6089 Patient breastfeeding infant. 0710 Verbal shift change report given to Manfred Marin RN (oncoming nurse) by Brittny Taylor RN (offgoing nurse). Report included the following information SBAR, Kardex, Procedure Summary, Intake/Output, MAR and Recent Results.

## 2022-02-16 NOTE — PROGRESS NOTES
Post-Partum Day Number 1 Progress Note    Kavin Stiles     Assessment:   Hospital Problems  Date Reviewed: 2/15/2022          Codes Class Noted POA    Spontaneous vaginal delivery ICD-10-CM: O80  ICD-9-CM: 344  2/15/2022 No        Shoulder dystocia during labor and delivery ICD-10-CM: O66.0  ICD-9-CM: 660.40  2/15/2022 No        Perineal laceration with delivery, second degree ICD-10-CM: O70.1  ICD-9-CM: 664.11  2/15/2022 No        * (Principal) Excessive fetal growth affecting management of pregnancy in third trimester ICD-10-CM: O36.63X0  ICD-9-CM: 656.63  2022 Yes            Doing well, post partum day 1    Plan:  1. Continue routine postpartum and perineal care as well as maternal education. 2. Circumcision has been performed. Information for the patient's : Joey Roche [[id number]]   Vaginal, Spontaneous    Patient doing well without significant complaint. Voiding without difficulty, normal lochia. Patient is breast feeding baby. He is under bili lights now. Current Facility-Administered Medications   Medication Dose Route Frequency       Vitals:  Visit Vitals  /64 (BP 1 Location: Right upper arm, BP Patient Position: At rest;Sitting)   Pulse 80   Temp 97.6 °F (36.4 °C)   Resp 18   Ht 5' 5\" (1.651 m)   Wt 88.9 kg (196 lb)   LMP 05/15/2021 (Approximate)   SpO2 99%   Breastfeeding Yes   BMI 32.62 kg/m²     Temp (24hrs), Av.7 °F (36.5 °C), Min:97.5 °F (36.4 °C), Max:98 °F (36.7 °C)        Exam:   Patient without distress. Abdomen soft, fundus firm, nontender                Perineum with normal lochia noted. Lower extremities are negative for swelling, cords or tenderness.     Labs:     Lab Results   Component Value Date/Time    WBC 12.5 2022 05:21 AM    WBC 8.9 2022 05:39 AM    WBC 7.8 2021 11:24 AM    HGB 11.7 (L) 2022 05:21 AM    HGB 13.3 2022 05:39 AM    HGB 13.6 2021 11:24 AM    HCT 34.6 (L) 2022 05:21 AM    HCT 39.3 02/14/2022 05:39 AM    HCT 39.2 07/16/2021 11:24 AM    PLATELET 485 (L) 53/59/1234 05:21 AM    PLATELET 941 (L) 43/67/7101 05:39 AM    PLATELET 349 50/74/9838 11:24 AM       Recent Results (from the past 24 hour(s))   CBC WITH AUTOMATED DIFF    Collection Time: 02/16/22  5:21 AM   Result Value Ref Range    WBC 12.5 4.6 - 13.2 K/uL    RBC 3.69 (L) 4.20 - 5.30 M/uL    HGB 11.7 (L) 12.0 - 16.0 g/dL    HCT 34.6 (L) 35.0 - 45.0 %    MCV 93.8 78.0 - 100.0 FL    MCH 31.7 24.0 - 34.0 PG    MCHC 33.8 31.0 - 37.0 g/dL    RDW 14.0 11.6 - 14.5 %    PLATELET 955 (L) 753 - 420 K/uL    MPV 11.7 9.2 - 11.8 FL    NRBC 0.0 0  WBC    ABSOLUTE NRBC 0.00 0.00 - 0.01 K/uL    NEUTROPHILS 65 40 - 73 %    LYMPHOCYTES 25 21 - 52 %    MONOCYTES 7 3 - 10 %    EOSINOPHILS 1 0 - 5 %    BASOPHILS 0 0 - 2 %    IMMATURE GRANULOCYTES 1 (H) 0.0 - 0.5 %    ABS. NEUTROPHILS 8.2 (H) 1.8 - 8.0 K/UL    ABS. LYMPHOCYTES 3.2 0.9 - 3.6 K/UL    ABS. MONOCYTES 0.9 0.05 - 1.2 K/UL    ABS. EOSINOPHILS 0.2 0.0 - 0.4 K/UL    ABS. BASOPHILS 0.0 0.0 - 0.1 K/UL    ABS. IMM.  GRANS. 0.1 (H) 0.00 - 0.04 K/UL    DF AUTOMATED

## 2022-02-16 NOTE — PROGRESS NOTES
Problem: Patient Education: Go to Patient Education Activity  Goal: Patient/Family Education  Outcome: Progressing Towards Goal     Problem: Pain  Goal: *Control of Pain  Outcome: Progressing Towards Goal     Problem: Falls - Risk of  Goal: *Absence of Falls  Description: Document Vernia Colder Fall Risk and appropriate interventions in the flowsheet.   Outcome: Progressing Towards Goal  Note: Fall Risk Interventions:                 Elimination Interventions: Call light in reach,Patient to call for help with toileting needs              Problem: Patient Education: Go to Patient Education Activity  Goal: Patient/Family Education  Outcome: Progressing Towards Goal     Problem: Vaginal Delivery: Postpartum Day 1  Goal: Activity/Safety  Outcome: Progressing Towards Goal  Goal: Diagnostic Test/Procedures  Outcome: Progressing Towards Goal  Goal: Nutrition/Diet  Outcome: Progressing Towards Goal  Goal: Discharge Planning  Outcome: Progressing Towards Goal  Goal: Medications  Outcome: Progressing Towards Goal  Goal: Treatments/Interventions/Procedures  Outcome: Progressing Towards Goal  Goal: Psychosocial  Outcome: Progressing Towards Goal  Goal: *Vital signs within defined limits  Outcome: Progressing Towards Goal  Goal: *Labs within defined limits  Outcome: Progressing Towards Goal  Goal: *Hemodynamically stable  Outcome: Progressing Towards Goal  Goal: *Optimal pain control at patient's stated goal  Outcome: Progressing Towards Goal  Goal: *Participates in infant care  Outcome: Progressing Towards Goal  Goal: *Demonstrates progressive activity  Outcome: Progressing Towards Goal  Goal: *Performs self perineal care  Outcome: Progressing Towards Goal  Goal: *Appropriate parent-infant bonding  Outcome: Progressing Towards Goal  Goal: *Tolerating diet  Outcome: Progressing Towards Goal  Goal: *Performs self breast care  Outcome: Progressing Towards Goal

## 2022-02-16 NOTE — PROGRESS NOTES
0710 Bedside and Verbal shift change report given to REINA Alonso RN (oncoming nurse) by Radu Xiao. Corie Page (offgoing nurse). Report included the following information SBAR, Kardex, Procedure Summary, Intake/Output, MAR and Recent Results. 0725 Patient to call once finished breastfeeding to start triple photo    0830 educated patient and FOB on phototherapy, they had no questions at this time    0835 shift assessment complete     1005 patient has no needs or concerns at this time    1200 no needs or concerns at this time    1350 patient resting quietly with eyes closed    1620 patient breastfeeding, to call when finished for assessment and  bili    1715 reassessment complete    1848 updated patient on  results    1910 Bedside and Verbal shift change report given to ALICIA Page RN (oncoming nurse) by Stella Blanca. Barry Alonso RN (offgoing nurse). Report included the following information SBAR, Kardex, Procedure Summary, Intake/Output, MAR and Recent Results.

## 2022-02-17 VITALS
WEIGHT: 196 LBS | SYSTOLIC BLOOD PRESSURE: 120 MMHG | HEIGHT: 65 IN | TEMPERATURE: 98.1 F | HEART RATE: 78 BPM | BODY MASS INDEX: 32.65 KG/M2 | DIASTOLIC BLOOD PRESSURE: 78 MMHG | RESPIRATION RATE: 16 BRPM | OXYGEN SATURATION: 98 %

## 2022-02-17 PROCEDURE — 74011250637 HC RX REV CODE- 250/637: Performed by: MIDWIFE

## 2022-02-17 RX ADMIN — IBUPROFEN 800 MG: 400 TABLET, FILM COATED ORAL at 06:16

## 2022-02-17 NOTE — DISCHARGE INSTRUCTIONS
Patient Education        POST DELIVERY DISCHARGE INSTRUCTIONS    Name: Janet Fields  YOB: 1985  Primary Diagnosis: Principal Problem:    Excessive fetal growth affecting management of pregnancy in third trimester (2022)    Active Problems:    Spontaneous vaginal delivery (2/15/2022)      Shoulder dystocia during labor and delivery (2/15/2022)      Perineal laceration with delivery, second degree (2/15/2022)        General:     Diet/Diet Restrictions:  Eight 8-ounce glasses of fluid daily (water, juices); avoid excessive caffeine intake. Meals/snacks as desired which are high in fiber and carbohydrates and low in fat and cholesterol. Physical Activity / Restrictions / Safety:     Avoid heavy lifting, no more that 8 lbs. For 2-3 weeks; limit use of stairs to 2 times daily for the first week home. No driving for one week. Avoid intercourse 4-6 weeks, no douching or tampon use. Check with obstetrician before starting or resuming an exercise program.         Discharge Instructions/Special Treatment/Home Care Needs:     Continue prenatal vitamins. Continue to use squirt bottle with warm water on your episiotomy after each bathroom use until bleeding stops. If steri-strips applied to your incision, remove in 7-10 days. Call your doctor for the following:     Fever over 101 degrees by mouth. Vaginal bleeding heavier than a normal menstrual period or clot larger than a golf ball. Red streaks or increased swelling of legs, painful red streaks on your breast.  Painful urination, constipation and increased pain or swelling or discharge with your incision. If you feel extremely anxious or overwhelmed. If you have thoughts of harming yourself and/or your baby. Pain Management:     Pain Management:   Take Acetaminophen (Tylenol) or Ibuprofen (Advil, Motrin), as directed for pain. Use a warm Sitz bath 3 times daily to relieve episiotomy or hemorrhoidal discomfort.  Heating pad to  incision as needed. For hemorrhoidal discomfort, use Tucks and Anusol cream as needed and directed. Follow-Up Care: These are general instructions for a healthy lifestyle:    No smoking/ No tobacco products/ Avoid exposure to second hand smoke    Surgeon General's Warning:  Quitting smoking now greatly reduces serious risk to your health. Obesity, smoking, and sedentary lifestyle greatly increases your risk for illness    A healthy diet, regular physical exercise & weight monitoring are important for maintaining a healthy lifestyle    Recognize signs and symptoms of STROKE:    F-face looks uneven    A-arms unable to move or move unevenly    S-speech slurred or non-existent    T-time-call 911 as soon as signs and symptoms begin-DO NOT go       Back to bed or wait to see if you get better-TIME IS BRAIN. After Your Delivery (the Postpartum Period): Care Instructions  Your Care Instructions     Congratulations on the birth of your baby. Like pregnancy, the  period can be a time of excitement, sveta, and exhaustion. You may look at your wondrous little baby and feel happy. You may also be overwhelmed by your new sleep hours and new responsibilities. At first, babies often sleep during the days and are awake at night. They do not have a pattern or routine. They may make sudden gasps, jerk themselves awake, or look like they have crossed eyes. These are all normal, and they may even make you smile. In these first weeks after delivery, try to take good care of yourself. It may take 4 to 6 weeks to feel like yourself again, and possibly longer if you had a  birth. You will likely feel very tired for several weeks. Your days will be full of ups and downs, but lots of sveta as well. Follow-up care is a key part of your treatment and safety. Be sure to make and go to all appointments, and call your doctor if you are having problems.  It's also a good idea to know your test results and keep a list of the medicines you take. How can you care for yourself at home? Take care of your body after delivery  · Use pads instead of tampons for the bloody flow that may last as long as 2 weeks. · Ease cramps with ibuprofen (Advil, Motrin). · Ease soreness of hemorrhoids and the area between your vagina and rectum with ice compresses or witch hazel pads. · Ease constipation by drinking lots of fluid and eating high-fiber foods. Ask your doctor about over-the-counter stool softeners. · Cleanse yourself with a gentle squeeze of warm water from a bottle instead of wiping with toilet paper. · Take a sitz bath in warm water several times a day. · Wear a good nursing bra. Ease sore and swollen breasts with warm, wet washcloths. · If you aren't breastfeeding, use ice rather than heat for breast soreness. · Your period may not start for several months if you are breastfeeding. You may bleed more, and longer at first, than you did before you got pregnant. · Wait until you are healed (about 4 to 6 weeks) before you have sex. Ask your doctor when it is okay for you to have sex. · Try not to travel with your baby for 5 or 6 weeks. If you take a long car trip, make frequent stops to walk around and stretch. Avoid exhaustion  · Rest every day. Try to nap when your baby naps. · Ask another adult to be with you for a few days after delivery. · Plan for  if you have other children. · Stay flexible so you can eat at odd hours and sleep when you need to. Both you and your baby are making new schedules. · Plan small trips to get out of the house. Change can make you feel less tired. · Ask for help with housework, cooking, and shopping. Remind yourself that your job is to care for your baby. Know about help for postpartum depression  · \"Baby blues\" are common for the first 1 to 2 weeks after birth. You may cry or feel sad or irritable for no reason. · Rest whenever you can.  Being tired makes it harder to handle your emotions. · Go for walks with your baby. · Talk to your partner, friends, and family about your feelings. · If your symptoms last for more than a few weeks, or if you feel very depressed, ask your doctor for help. · Postpartum depression can be treated. Support groups and counseling can help. Sometimes medicine can also help. Stay healthy  · Eat healthy foods so you have more energy. · If you breastfeed, avoid drugs. If you quit smoking during pregnancy, try to stay smoke-free. If you choose to have a drink now and then, have only one drink, and limit the number of occasions that you have a drink. Wait to breastfeed at least 2 hours after you have a drink to reduce the amount of alcohol the baby may get in the milk. · Start daily exercise after 4 to 6 weeks, but rest when you feel tired. · Learn exercises to tone your belly. Do Kegel exercises to regain strength in your pelvic muscles. You can do these exercises while you stand or sit. ? Squeeze the same muscles you would use to stop your urine. Your belly and thighs should not move. ? Hold the squeeze for 3 seconds, and then relax for 3 seconds. ? Start with 3 seconds. Then add 1 second each week until you are able to squeeze for 10 seconds. ? Repeat the exercise 10 to 15 times for each session. Do three or more sessions each day. · Find a class for you and your baby that has an exercise time. · If you had a  birth, give yourself a bit more time before you exercise, and be careful. When should you call for help? Call 911  anytime you think you may need emergency care. For example, call if:    · You have thoughts of harming yourself, your baby, or another person.     · You passed out (lost consciousness).     · You have chest pain, are short of breath, or cough up blood.     · You have a seizure. Call your doctor now or seek immediate medical care if:    · You have severe vaginal bleeding.  This means you are passing blood clots and soaking through a pad each hour for 2 or more hours.     · You are dizzy or lightheaded, or you feel like you may faint.     · You have a fever.     · You have new or more belly pain.     · You have signs of a blood clot in your leg (called a deep vein thrombosis), such as:  ? Pain in the calf, back of the knee, thigh, or groin. ? Redness and swelling in your leg or groin.     · You have signs of preeclampsia, such as:  ? Sudden swelling of your face, hands, or feet. ? New vision problems (such as dimness, blurring, or seeing spots). ? A severe headache. Watch closely for changes in your health, and be sure to contact your doctor if:    · Your vaginal bleeding seems to be getting heavier.     · You have new or worse vaginal discharge.     · You feel sad, anxious, or hopeless for more than a few days.     · You do not get better as expected. Where can you learn more? Go to http://www.diaz.com/  Enter A461 in the search box to learn more about \"After Your Delivery (the Postpartum Period): Care Instructions. \"  Current as of: June 16, 2021               Content Version: 13.0  © 0108-3693 [employer]. Care instructions adapted under license by [employer] (which disclaims liability or warranty for this information). If you have questions about a medical condition or this instruction, always ask your healthcare professional. [place] [address] any warranty or liability for your use of this information. Patient Education        Perineal Tear: What to Expect at Home  Your Recovery  A perineal tear can happen when you deliver your baby. It is a tear to your perineum (say \"gzzv-xf-CZF-um\"), which is the area between your vagina and anus. After delivery, the doctor or midwife usually closes the perineal tear with stitches. The stitches will dissolve in 1 to 2 weeks, so they will not need to be removed.  You may notice pieces of the stitches on your sanitary pad or on the toilet paper when you go to the bathroom. This is normal.  Sometimes, a small tear won't be closed with stitches and will be allowed to heal on its own. You may place an ice pack against your perineum to ease pain and swelling. Recovery can be uncomfortable or painful, depending on how deep and long the tear is. It's most painful at the beginning, but you should feel better each day. Pain typically affects sitting, walking, urinating, and bowel movements for at least a week. Your first bowel movement may be painful. A tear is usually healed in about 4 to 6 weeks. This care sheet gives you a general idea about how long it will take for you to recover. But each woman recovers at a different pace. Follow the steps below to feel better as quickly as possible. How can you care for yourself at home? Activity    · Rest when you feel tired. Getting enough sleep will help you recover.     · Try to walk each day. Start by walking a little more than you did the day before. Bit by bit, increase the amount you walk. Walking boosts blood flow and helps prevent pneumonia and constipation.     · Avoid strenuous activities, such as bicycle riding, jogging, weight lifting, or aerobic exercise, until your doctor says it is okay.     · Until your doctor says it is okay, do not lift anything heavier than your baby.     · Ask your doctor when you can drive again.     · You may shower and take baths as usual. Pat the incision dry when you are done.     · You will have some vaginal bleeding. Wear sanitary pads. Do not douche or use tampons until your doctor says it is okay.     · Ask your doctor when it is okay for you to have sex. Diet    · You can eat your normal diet.     · Drink plenty of fluids (unless your doctor tells you not to).     · You may notice that your bowel movements are not regular right after your delivery. This is common. Try to avoid constipation and straining with bowel movements. You may want to take a fiber supplement every day. If you have not had a bowel movement after a couple of days, ask your doctor about taking a mild laxative. Medicines    · Your doctor will tell you if and when you can restart your medicines. He or she will also give you instructions about taking any new medicines.     · If you take aspirin or some other blood thinner, ask your doctor if and when to start taking it again. Make sure that you understand exactly what your doctor wants you to do.     · If you have pain, take pain medicines exactly as directed. ? If the doctor gave you a prescription medicine for pain, take it as prescribed. ? If you are not taking a prescription pain medicine, ask your doctor if you can take an over-the-counter medicine.     · If you think your pain medicine is making you sick to your stomach:  ? Take your medicine after meals (unless your doctor has told you not to). ? Ask your doctor for a different pain medicine. Wound care    · Put ice or a cold pack on the sore area for 10 to 20 minutes at a time. Put a thin cloth between the ice and your skin.     · Sit in a few inches of warm water (sitz bath) for 15 to 20 minutes 3 times a day and after bowel movements. Then pat the area dry. Do this as long as you have pain. You may find that it feels better if you dry the area with a hair dryer instead of using a towel to pat the area dry.     · Keep the area clean by pouring or spraying warm water over the area outside your vagina and anus after you use the toilet. Use baby wipes or medicated pads, such as Tucks, instead of toilet paper after a bowel movement. Follow-up care is a key part of your treatment and safety. Be sure to make and go to all appointments, and call your doctor if you are having problems. It's also a good idea to know your test results and keep a list of the medicines you take. When should you call for help?    Call 911 anytime you think you may need emergency care. For example, call if:    · You passed out (lost consciousness). Call your doctor now or seek immediate medical care if:    · You have severe vaginal bleeding.     · You are dizzy or lightheaded, or you feel like you may faint.     · You have a fever.     · Your pain is worse. Watch closely for changes in your health, and be sure to contact your doctor if:    · Your vaginal bleeding seems heavier.     · You have new or worse vaginal discharge.     · You are not getting better as expected. Where can you learn more? Go to http://www.gray.com/  Enter D248 in the search box to learn more about \"Perineal Tear: What to Expect at Home. \"  Current as of: June 16, 2021               Content Version: 13.0  © 2181-4563 [employer]. Care instructions adapted under license by [employer] (which disclaims liability or warranty for this information). If you have questions about a medical condition or this instruction, always ask your healthcare professional. [place] [address] any warranty or liability for your use of this information. Patient Education        Breastfeeding: Care Instructions  Overview     Breastfeeding has many benefits. It may lower your baby's chances of getting an infection. It also may make it less likely that your baby will have problems such as diabetes and obesity later in life. Breastfeeding also helps you bond with your baby. In the first days after birth, your breasts make a thick, yellow liquid called colostrum. This liquid gives your baby nutrients and antibodies against infection. It is all that babies need in the first days after birth. Your breasts will fill with milk a few days after the birth. Breastfeeding is a skill that gets better with practice. Be patient with yourself and your baby. If you have trouble, you can get help and keep breastfeeding. Follow-up care is a key part of your treatment and safety.  Be sure to make and go to all appointments, and call your doctor if you are having problems. It's also a good idea to know your test results and keep a list of the medicines you take. How can you care for yourself at home? · Breastfeed your baby whenever your baby is hungry. In the first 2 weeks, your baby will breastfeed at least 8 times in a 24-hour period. This will help you keep up your supply of milk. Signs that your baby is hungry include:  ? Sucking on their hands. ? [place] their lips. ? Turning their head toward your breast.  · Put a bed pillow or a nursing pillow on your lap to support your arms and your baby. · Hold your baby in a comfortable position. ? You can hold your baby in several ways. One of the most common positions is the cradle hold. One arm supports your baby, with your baby's head in the bend of your elbow. Your open hand supports your baby's bottom or back. Your baby's belly lies against yours. ? If you had your baby by , or , try the football hold. This position keeps your baby off your belly. Tuck your baby under your arm, with your baby's body along the side you will be feeding on. Support your baby's upper body with your arm. With that hand you can control your baby's head to bring their mouth to your breast.  ? Try different positions with each feeding. If you are having problems, ask for help from your doctor or a lactation consultant. · To get your baby to latch on:  ? Support and narrow your breast with one hand using a \"U hold,\" with your thumb on the outer side of your breast and your fingers on the inner side. You can also use a \"C hold,\" with all your fingers below the nipple and your thumb above it. Try the different holds to get the deepest latch for whichever breastfeeding position you use. Your other arm is behind your baby's back, with your hand supporting the base of the baby's head.  Position your fingers and thumb to point toward your baby's ears.  ? You can touch your baby's lower lip with your nipple to get your baby's mouth to open. Wait until your baby opens up really wide, like a big yawn. Then be sure to bring the baby quickly to your breast--not your breast to the baby. As you bring your baby toward your breast, use your other hand to support the breast and guide it into your baby's mouth. ? Both the nipple and a large portion of the darker area around the nipple (areola) should be in the baby's mouth. The baby's lips should be flared outward, not folded in (inverted). ? Listen for a regular sucking and swallowing pattern while the baby is feeding. If you can't see or hear a swallowing pattern, watch the baby's ears. They will wiggle slightly when the baby swallows. If the baby's nose appears to be blocked by your breast, bring your baby's body closer to you. This will help tilt the baby's head back slightly, so just the edge of one nostril is clear for breathing. ? When your baby is latched, you can usually remove your hand from supporting your breast and use it to cradle under your baby. Now just relax and breastfeed your baby. · You will know that your baby is feeding well when:  ? Your baby's mouth covers a lot of the areola, and the lips are flared out. ? Your baby's chin and nose rest against your breast.  ? Sucking is deep and rhythmic, with short pauses. ? You are able to see and hear your baby swallowing. ? You do not feel pain in your nipple. · Offer both breasts to your baby at each feeding. Each time you breastfeed, switch which breast you start with. · Anytime you need to remove your baby from the breast, put one finger in the corner of your baby's mouth. Push your finger between your baby's gums to gently break the seal. If you don't break the tight seal before you remove your baby, your nipples can become sore, cracked, or bruised. · After you finish feeding, gently pat your baby's back to let out any swallowed air.  After your baby burps, offer the breast again, or offer the other breast. Sometimes a baby will want to keep feeding after being burped. When should you call for help? Call your doctor now or seek immediate medical care if:    · You have symptoms of a breast infection, such as:  ? Increased pain, swelling, redness, or warmth around a breast.  ? Red streaks extending from the breast.  ? Pus draining from a breast.  ? A fever.     · Your baby has no wet diapers for 6 hours. Watch closely for changes in your health, and be sure to contact your doctor if:    · Your baby has trouble latching on to your breast.     · You continue to have pain or discomfort when breastfeeding.     · You have other questions or concerns. Where can you learn more? Go to http://www.gray.com/  Enter P492 in the search box to learn more about \"Breastfeeding: Care Instructions. \"  Current as of: June 16, 2021               Content Version: 13.0  © 2006-2021 [employer]. Care instructions adapted under license by [employer] (which disclaims liability or warranty for this information). If you have questions about a medical condition or this instruction, always ask your healthcare professional. [address] any warranty or liability for your use of this information.

## 2022-02-17 NOTE — PROGRESS NOTES
0710 Verbal shift change report given to Marilynn Oseguera RN (oncoming nurse) by Vi Ribera RN (offgoing nurse). Report included the following information SBAR, Kardex, Procedure Summary, Intake/Output, MAR and Recent Results. 0720: Rounded on pt, pt resting in bed w/ no needs. MD notified of EPDS score at this time. 0900: Rounded on pt. Pt breastfeeding at this time. Pt denies pain. 0034: Infant POC reviewed w/ pt. Shift assessment completed. Pt denies needs at this time. 1338: Rounded on pt. Pt working w/ lactation consultant. No needs at this time. 1430: Pt hand expressed breast milk and spoon fed infant. 1655: Reassessment completed. 1830: D/C teaching complete and copy of D/C teaching instruction given to pt with verbal understanding. Pt given opportunity for questions and denies comments/concerns/questions at this time. 1910 Verbal shift change report given to LOUIS Leon RN (oncoming nurse) by Marilynn Oseguera RN (offgoing nurse). Report included the following information SBAR, Kardex, Procedure Summary, Intake/Output, MAR and Recent Results.

## 2022-02-17 NOTE — DISCHARGE SUMMARY
Obstetrical Discharge Summary     Name: Olivia Barclay MRN: [id number]  SSN: [id number]    YOB: 1985  Age: 40 y.o. Sex: female      Admit Date: 2022    Discharge Date: 2022    Admitting Physician: Marty Padilla MD     Attending Physician:  Uvaldo Hernandez MD     Discharge Diagnoses:   Information for the patient's : Jess Signs [[id number]]   Delivery of a 4.63 kg male infant via Vaginal, Spontaneous on 2/15/2022 at 4:17 AM  by Orestes Rich. Apgars were 8  and 8 . Additional Diagnoses:   Problem List as of 2022 Date Reviewed: 2022          Codes Class Noted - Resolved    Spontaneous vaginal delivery ICD-10-CM: O80  ICD-9-CM: 650  2/15/2022 - Present        Shoulder dystocia during labor and delivery ICD-10-CM: O66.0  ICD-9-CM: 660.40  2/15/2022 - Present        Perineal laceration with delivery, second degree ICD-10-CM: O70.1  ICD-9-CM: 664.11  2/15/2022 - Present        * (Principal) Excessive fetal growth affecting management of pregnancy in third trimester ICD-10-CM: O36.63X0  ICD-9-CM: 656.63  2022 - Present        Family history of spina bifida ICD-10-CM: Z82.79  ICD-9-CM: V19.5  2019 - Present    Overview Signed 2019 10:41 AM by Mitzi Fernández NP     Paternal aunt              RESOLVED: Pregnancy with 44 completed weeks gestation ICD-10-CM: Z3A.39  ICD-9-CM: V22.2  2022 - 2/15/2022        RESOLVED: AMA (advanced maternal age) multigravida 33+, first trimester ICD-10-CM: O09.521  ICD-9-CM: 659.63  2021 - 2/15/2022    Overview Signed 2021  1:23 PM by Gera Irizarry offered; pt thinking                   Lab Results   Component Value Date/Time    Rubella, External immune 2021 12:00 AM    GrBStrep, External negative 2022 12:00 AM     Recent Labs     22  0521   HGB 11.7*       Hospital Course: Normal hospital course following the delivery.     Disposition: home    Discharge Condition: Good    Patient Instructions:   Current Discharge Medication List      CONTINUE these medications which have NOT CHANGED    Details   PNV66-Iron Fumarate-FA-DSS-DHA 26-1.2- mg cap Take  by mouth. Reference my discharge instructions. Follow-up Appointments   Procedures    FOLLOW UP VISIT Appointment in: 6 Weeks Follow up in 4-6 weeks for your routine postpartum visit. Follow up in 4-6 weeks for your routine postpartum visit.      Standing Status:   Standing     Number of Occurrences:   1     Order Specific Question:   Appointment in     Answer:   6 Weeks        Signed By:  Reno Townsend MD     February 17, 2022

## 2022-02-17 NOTE — PROGRESS NOTES
1910 Bedside and Verbal shift change report given to ALICIA Cristobal RN (oncoming nurse) by James Ortiz RN (offgoing nurse). Report included the following information SBAR, Kardex, OR Summary, Procedure Summary, Intake/Output, MAR and Recent Results.      1930 Assessment completed. 2310 Patient awake in bed. No needs or concerns at this time. 9420 Patient called for assistance in bottle feeding infant. 2571 Patient awake next to infant's bassinet in bed.    0710 Verbal shift change report given to Amina Santa RN (oncoming nurse) by Delroy Sánchez RN (offgoing nurse). Report included the following information SBAR, Kardex, Procedure Summary, Intake/Output, MAR and Recent Results.

## 2022-02-17 NOTE — PROGRESS NOTES
Post-Partum Day Number 2 Progress Note    Deja Stiles     Assessment:   Hospital Problems  Date Reviewed: 2022          Codes Class Noted POA    Spontaneous vaginal delivery ICD-10-CM: O80  ICD-9-CM: 615  2/15/2022 No        Shoulder dystocia during labor and delivery ICD-10-CM: O66.0  ICD-9-CM: 660.40  2/15/2022 No        Perineal laceration with delivery, second degree ICD-10-CM: O70.1  ICD-9-CM: 664.11  2/15/2022 No        * (Principal) Excessive fetal growth affecting management of pregnancy in third trimester ICD-10-CM: O36.63X0  ICD-9-CM: 656.63  2022 Yes            Doing well, post partum day 2    Plan:   1. Discharge home today  2. Follow up in office in 6 weeks with Hu Anthony MD   3. Post partum activity advised, diet as tolerated  4. Discharge Medications: ibuprofen, tylenol and medications prior to admission    Information for the patient's : Savanah Devlin [[id number]]   Vaginal, Spontaneous    Patient doing well without significant complaint. Voiding without difficulty, normal lochia. Current Facility-Administered Medications   Medication Dose Route Frequency       Vitals:  Visit Vitals  /67 (BP 1 Location: Right upper arm, BP Patient Position: At rest;Lying)   Pulse 76   Temp 97.7 °F (36.5 °C)   Resp 18   Ht 5' 5\" (1.651 m)   Wt 88.9 kg (196 lb)   LMP 05/15/2021 (Approximate)   SpO2 100%   Breastfeeding Yes   BMI 32.62 kg/m²     Temp (24hrs), Av.9 °F (36.6 °C), Min:97.7 °F (36.5 °C), Max:98.1 °F (36.7 °C)      Exam:         Patient without distress. Abdomen soft, fundus firm, nontender                 Lower extremities are negative for swelling, cords or tenderness.     Labs:     Lab Results   Component Value Date/Time    WBC 12.5 2022 05:21 AM    WBC 8.9 2022 05:39 AM    WBC 7.8 2021 11:24 AM    HGB 11.7 (L) 2022 05:21 AM    HGB 13.3 2022 05:39 AM    HGB 13.6 2021 11:24 AM    HCT 34.6 (L) 2022 05:21 AM    HCT 39.3 02/14/2022 05:39 AM    HCT 39.2 07/16/2021 11:24 AM    PLATELET 597 (L) 29/52/8168 05:21 AM    PLATELET 279 (L) 35/37/3773 05:39 AM    PLATELET 771 13/81/7775 11:24 AM       No results found for this or any previous visit (from the past 24 hour(s)).

## 2022-02-17 NOTE — LACTATION NOTE
This note was copied from a baby's chart. 1331 infant latched and nursing well. Encouraged q2-3 feeds for at least 15 minutes on each breast, then to hand express and spoon fed any colostrum that is expressed. Then to follow each feeding with the provider ordered bottle. Mom stated \"I have been able to express between 2-2 1/2 mls when I hand express\". Mom stated she does not want to begin pumping at this time.

## 2022-02-18 NOTE — PROGRESS NOTES
1910 Verbal shift change report given to LOUIS Page RN (oncoming nurse) by Conor Holland RN (offgoing nurse). Report included the following information SBAR, Kardex, Procedure Summary, Intake/Output, MAR and Recent Results. 2020 Patient discharged from unit.